# Patient Record
Sex: FEMALE | Race: WHITE | NOT HISPANIC OR LATINO | Employment: PART TIME | URBAN - METROPOLITAN AREA
[De-identification: names, ages, dates, MRNs, and addresses within clinical notes are randomized per-mention and may not be internally consistent; named-entity substitution may affect disease eponyms.]

---

## 2017-10-07 ENCOUNTER — OFFICE VISIT (OUTPATIENT)
Dept: URGENT CARE | Age: 57
End: 2017-10-07
Payer: COMMERCIAL

## 2017-10-07 ENCOUNTER — HOSPITAL ENCOUNTER (INPATIENT)
Facility: HOSPITAL | Age: 57
LOS: 1 days | Discharge: HOME | DRG: 066 | End: 2017-10-09
Attending: EMERGENCY MEDICINE | Admitting: INTERNAL MEDICINE
Payer: COMMERCIAL

## 2017-10-07 ENCOUNTER — APPOINTMENT (EMERGENCY)
Dept: CT IMAGING | Facility: HOSPITAL | Age: 57
DRG: 066 | End: 2017-10-07
Payer: COMMERCIAL

## 2017-10-07 DIAGNOSIS — G45.9 TIA (TRANSIENT ISCHEMIC ATTACK): Primary | ICD-10-CM

## 2017-10-07 DIAGNOSIS — R09.89 SYMPTOMS OF CEREBROVASCULAR ACCIDENT (CVA): ICD-10-CM

## 2017-10-07 LAB
ALBUMIN SERPL BCP-MCNC: 3.8 G/DL (ref 3.5–5)
ALP SERPL-CCNC: 69 U/L (ref 46–116)
ALT SERPL W P-5'-P-CCNC: 32 U/L (ref 12–78)
ANION GAP SERPL CALCULATED.3IONS-SCNC: 4 MMOL/L (ref 4–13)
APTT PPP: 28 SECONDS (ref 23–35)
AST SERPL W P-5'-P-CCNC: 26 U/L (ref 5–45)
BASOPHILS # BLD AUTO: 0.03 THOUSANDS/ΜL (ref 0–0.1)
BASOPHILS NFR BLD AUTO: 0 % (ref 0–1)
BILIRUB SERPL-MCNC: 0.4 MG/DL (ref 0.2–1)
BUN SERPL-MCNC: 14 MG/DL (ref 5–25)
CALCIUM SERPL-MCNC: 9 MG/DL (ref 8.3–10.1)
CHLORIDE SERPL-SCNC: 103 MMOL/L (ref 100–108)
CO2 SERPL-SCNC: 28 MMOL/L (ref 21–32)
CREAT SERPL-MCNC: 0.78 MG/DL (ref 0.6–1.3)
EOSINOPHIL # BLD AUTO: 0.35 THOUSAND/ΜL (ref 0–0.61)
EOSINOPHIL NFR BLD AUTO: 4 % (ref 0–6)
ERYTHROCYTE [DISTWIDTH] IN BLOOD BY AUTOMATED COUNT: 13.7 % (ref 11.6–15.1)
FIBRINOGEN PPP-MCNC: 284 MG/DL (ref 227–495)
GFR SERPL CREATININE-BSD FRML MDRD: 84 ML/MIN/1.73SQ M
GLUCOSE SERPL-MCNC: 88 MG/DL (ref 65–140)
GLUCOSE SERPL-MCNC: 90 MG/DL (ref 65–140)
HCT VFR BLD AUTO: 41.2 % (ref 34.8–46.1)
HGB BLD-MCNC: 13.5 G/DL (ref 11.5–15.4)
INR PPP: 0.86 (ref 0.86–1.16)
LYMPHOCYTES # BLD AUTO: 3.29 THOUSANDS/ΜL (ref 0.6–4.47)
LYMPHOCYTES NFR BLD AUTO: 36 % (ref 14–44)
MCH RBC QN AUTO: 30 PG (ref 26.8–34.3)
MCHC RBC AUTO-ENTMCNC: 32.8 G/DL (ref 31.4–37.4)
MCV RBC AUTO: 92 FL (ref 82–98)
MONOCYTES # BLD AUTO: 0.9 THOUSAND/ΜL (ref 0.17–1.22)
MONOCYTES NFR BLD AUTO: 10 % (ref 4–12)
NEUTROPHILS # BLD AUTO: 4.63 THOUSANDS/ΜL (ref 1.85–7.62)
NEUTS SEG NFR BLD AUTO: 50 % (ref 43–75)
PLATELET # BLD AUTO: 350 THOUSANDS/UL (ref 149–390)
PLATELET # BLD AUTO: 350 THOUSANDS/UL (ref 149–390)
PMV BLD AUTO: 10.7 FL (ref 8.9–12.7)
PMV BLD AUTO: 10.7 FL (ref 8.9–12.7)
POTASSIUM SERPL-SCNC: 3.7 MMOL/L (ref 3.5–5.3)
PROT SERPL-MCNC: 7.3 G/DL (ref 6.4–8.2)
PROTHROMBIN TIME: 12 SECONDS (ref 12.1–14.4)
RBC # BLD AUTO: 4.5 MILLION/UL (ref 3.81–5.12)
SODIUM SERPL-SCNC: 135 MMOL/L (ref 136–145)
TROPONIN I SERPL-MCNC: <0.02 NG/ML
WBC # BLD AUTO: 9.2 THOUSAND/UL (ref 4.31–10.16)

## 2017-10-07 PROCEDURE — 85730 THROMBOPLASTIN TIME PARTIAL: CPT | Performed by: EMERGENCY MEDICINE

## 2017-10-07 PROCEDURE — 80053 COMPREHEN METABOLIC PANEL: CPT | Performed by: EMERGENCY MEDICINE

## 2017-10-07 PROCEDURE — 93005 ELECTROCARDIOGRAM TRACING: CPT

## 2017-10-07 PROCEDURE — 84484 ASSAY OF TROPONIN QUANT: CPT | Performed by: EMERGENCY MEDICINE

## 2017-10-07 PROCEDURE — 85611 PROTHROMBIN TEST: CPT | Performed by: EMERGENCY MEDICINE

## 2017-10-07 PROCEDURE — 82948 REAGENT STRIP/BLOOD GLUCOSE: CPT

## 2017-10-07 PROCEDURE — 85384 FIBRINOGEN ACTIVITY: CPT | Performed by: EMERGENCY MEDICINE

## 2017-10-07 PROCEDURE — 85025 COMPLETE CBC W/AUTO DIFF WBC: CPT | Performed by: EMERGENCY MEDICINE

## 2017-10-07 PROCEDURE — 70450 CT HEAD/BRAIN W/O DYE: CPT

## 2017-10-07 PROCEDURE — 85610 PROTHROMBIN TIME: CPT | Performed by: EMERGENCY MEDICINE

## 2017-10-07 PROCEDURE — 85049 AUTOMATED PLATELET COUNT: CPT | Performed by: EMERGENCY MEDICINE

## 2017-10-07 PROCEDURE — 85732 THROMBOPLASTIN TIME PARTIAL: CPT | Performed by: EMERGENCY MEDICINE

## 2017-10-07 PROCEDURE — 36415 COLL VENOUS BLD VENIPUNCTURE: CPT | Performed by: EMERGENCY MEDICINE

## 2017-10-07 PROCEDURE — 85670 THROMBIN TIME PLASMA: CPT | Performed by: EMERGENCY MEDICINE

## 2017-10-07 PROCEDURE — 93005 ELECTROCARDIOGRAM TRACING: CPT | Performed by: EMERGENCY MEDICINE

## 2017-10-07 PROCEDURE — 99203 OFFICE O/P NEW LOW 30 MIN: CPT | Performed by: FAMILY MEDICINE

## 2017-10-07 PROCEDURE — 99285 EMERGENCY DEPT VISIT HI MDM: CPT

## 2017-10-07 RX ORDER — ATORVASTATIN CALCIUM 40 MG/1
40 TABLET, FILM COATED ORAL EVERY EVENING
Status: DISCONTINUED | OUTPATIENT
Start: 2017-10-07 | End: 2017-10-09 | Stop reason: HOSPADM

## 2017-10-07 RX ORDER — ASPIRIN 81 MG/1
81 TABLET, CHEWABLE ORAL DAILY
Status: DISCONTINUED | OUTPATIENT
Start: 2017-10-08 | End: 2017-10-09

## 2017-10-07 NOTE — PLAN OF CARE
NEUROSENSORY - ADULT     Achieves stable or improved neurological status Progressing     Achieves maximal functionality and self care Progressing

## 2017-10-07 NOTE — H&P
History and Physical - [facility] Internal Medicine    Patient Information: Jamia Arnold 61 y o  female MRN: [id number]  Unit/Bed#: RAIN Encounter: [id number]  Admitting Physician: Fransisca Desir MD  PCP: Melinda Rod DO  Date of Admission:  10/07/17    Assessment/Plan:    Hospital Problem List:     Principal Problem:    Symptoms of cerebrovascular accident (CVA)      Plan for the Primary Problem(s):    · Left upper extremity distal weakness and facial droop - overall out TIA, placed on stroke pathway, neuro checks, aspirin 81 mg, Lipitor 40 mg, Neurology consultation, PT OT    The patient and her  was at bedside were provided with a detailed updated, they are agreeable with ongoing management as outlined as well as consult requested  VTE Prophylaxis: Enoxaparin (Lovenox)  / sequential compression device   Code Status:  Full code  POLST: There is no POLST form on file for this patient (pre-hospital)    Anticipated Length of Stay:  Patient will be admitted on an Observation basis with an anticipated length of stay of  Less than 2 midnights  Chief Complaint:       Left upper extremity distal weakness and facial droop yesterday    History of Present Illness:    Jamia Arnold is a 61 y o  female who presents with left upper extremity distal weakness and left facial numbness noted yesterday  Patient was putting laundry to dry on the clothes line yesterday when she noticed that she had difficulty manipulating the clips with her left hand she reports it as "an annoying feeling that I was unable to control my left hand" she reports she had good strength in her shoulder and elbow  She also reports some numbness and tingling on her forearm area  At the same time she also noticed left facial droop /numbness  The symptoms resolved within a couple of minutes and have not recurred  She denies history suggestive of cranial involvement, speech abnormalities, sphincter involvement    No history of weakness elsewhere  No history of sensory symptoms elsewhere  She reports some headache  She reports "something is just not right"    No history of fever chills sweats or constitutional symptoms  No history of chest pain shortness of breath palpitations presyncope syncope  No history of abdominal pain vomiting diarrhea constipation  No history of change in weight, appetite unchanged  No history of rash arthritis arthralgia myalgia  She reports history of factor 5 laden in the family  She denies similar symptoms in the past, no history of known coronary artery disease diabetes hypertension dyslipidemia  Review of Systems:    Review of Systems   All other systems reviewed and are negative  Past Medical and Surgical History:     History reviewed  No pertinent past medical history  History reviewed  No pertinent surgical history  Meds/Allergies:    Prior to Admission medications    Medication Sig Start Date End Date Taking? Authorizing Provider   COD LIVER OIL PO Take by mouth   Yes Historical Provider, MD     I have reviewed home medications with patient personally  Allergies: No Known Allergies    Social History:     Marital Status: /Civil Union   Occupation:   Patient Pre-hospital Living Situation: home  Patient Pre-hospital Level of Mobility:  Independent  Patient Pre-hospital Diet Restrictions: no  Substance Use History:   History   Alcohol Use No     History   Smoking Status    Never Smoker   Smokeless Tobacco    Never Used     History   Drug Use No       Family History:    History reviewed  No pertinent family history      Physical Exam:     Vitals:   Blood Pressure: 123/63 (10/07/17 1734)  Pulse: 55 (10/07/17 1734)  Temperature: 98 4 °F (36 9 °C) (10/07/17 1430)  Temp Source: Oral (10/07/17 1430)  Respirations: 18 (10/07/17 1734)  Weight - Scale: 70 6 kg (155 lb 10 3 oz) (10/07/17 1457)  SpO2: 100 % (10/07/17 1734)    Physical Exam   Constitutional: She is oriented to person, place, and time  She appears well-developed and well-nourished  No distress  HENT:   Head: Normocephalic  Mouth/Throat: No oropharyngeal exudate  Eyes: Pupils are equal, round, and reactive to light  Right eye exhibits no discharge  Left eye exhibits no discharge  No scleral icterus  Neck: Normal range of motion  No JVD present  No tracheal deviation present  No thyromegaly present  Cardiovascular: Normal rate  No murmur heard  Pulmonary/Chest: Effort normal  She has no wheezes  She has no rales  She exhibits no tenderness  Abdominal: Soft  She exhibits no distension and no mass  There is no tenderness  There is no rebound and no guarding  Musculoskeletal: Normal range of motion  She exhibits no edema  Lymphadenopathy:     She has no cervical adenopathy  Neurological: She is alert and oriented to person, place, and time  No cranial nerve deficit  Skin: Skin is warm  No rash noted  She is not diaphoretic  Vitals reviewed  Additional Data:     Lab Results: I have personally reviewed pertinent reports  Results from last 7 days  Lab Units 10/07/17  1452   WBC Thousand/uL 9 20   HEMOGLOBIN g/dL 13 5   HEMATOCRIT % 41 2   PLATELETS Thousands/uL 350  350   NEUTROS PCT % 50   LYMPHS PCT % 36   MONOS PCT % 10   EOS PCT % 4       Results from last 7 days  Lab Units 10/07/17  1452   SODIUM mmol/L 135*   POTASSIUM mmol/L 3 7   CHLORIDE mmol/L 103   CO2 mmol/L 28   BUN mg/dL 14   CREATININE mg/dL 0 78   CALCIUM mg/dL 9 0   TOTAL PROTEIN g/dL 7 3   BILIRUBIN TOTAL mg/dL 0 40   ALK PHOS U/L 69   ALT U/L 32   AST U/L 26   GLUCOSE RANDOM mg/dL 90       Results from last 7 days  Lab Units 10/07/17  1452   INR  0 86       Imaging: I have personally reviewed pertinent reports  Ct Head Without Contrast    Result Date: 10/7/2017  Narrative: CT BRAIN - WITHOUT CONTRAST INDICATION:  Left arm weakness  Left hand numbness  COMPARISON:  None  TECHNIQUE:  CT examination of the brain was performed    In addition to axial images, coronal reformatted images were created and submitted for interpretation  Radiation dose length product (DLP) for this visit:  1000 mGy-cm   This examination, like all CT scans performed in the [facility], was performed utilizing techniques to minimize radiation dose exposure, including the use of iterative reconstruction and automated exposure control  IMAGE QUALITY:  Diagnostic  FINDINGS:  PARENCHYMA:  There is a focal area of low density identified within the lateral aspect of the right frontal lobe adjacent to the frontal operculum, series 2 image 18  This suggests mild localized edema  No associated hemorrhage  Mild localized mass effect  There is additional much smaller area of low density involving the cortex of the insula, slightly more posteriorly on series 2 image 18  Normal left hemisphere  Normal basal ganglia and basilar cisterns  Brainstem and cerebellum demonstrate normal density  There is no parenchymal hemorrhage  VENTRICLES AND EXTRA-AXIAL SPACES:  Normal for patient's age  VISUALIZED ORBITS AND PARANASAL SINUSES:  Unremarkable orbits  Mucosal thickening of the paranasal sinuses especially the right maxillary sinus where there is an air-fluid level which suggests an acute component of sinusitis  CALVARIUM AND EXTRACRANIAL SOFT TISSUES:   Normal      Impression: 2 separate areas of low density within the right hemisphere, both seen on series 2 image 18  The larger of the 2 measures approximately 2 2 cm involving cortex and adjacent white matter  Findings are nonspecific but may represent subacute infarcts  An  underlying mass cannot be excluded  Recommend MRI of the brain with contrast to include diffusion-weighted imaging to help differentiate infarct from mass  No hemorrhage identified   ##[email or web address]##imslh Workstation performed: [id number]       EKG, Pathology, and Other Studies Reviewed on Admission:   · EKG:  Normal sinus rhythm on telemetry    Allscripts / Epic Records Reviewed: No     ** Please Note: This note has been constructed using a voice recognition system   **

## 2017-10-08 ENCOUNTER — APPOINTMENT (INPATIENT)
Dept: ULTRASOUND IMAGING | Facility: HOSPITAL | Age: 57
DRG: 066 | End: 2017-10-08
Payer: COMMERCIAL

## 2017-10-08 ENCOUNTER — APPOINTMENT (OUTPATIENT)
Dept: CT IMAGING | Facility: HOSPITAL | Age: 57
DRG: 066 | End: 2017-10-08
Payer: COMMERCIAL

## 2017-10-08 ENCOUNTER — APPOINTMENT (OUTPATIENT)
Dept: MRI IMAGING | Facility: HOSPITAL | Age: 57
DRG: 066 | End: 2017-10-08
Payer: COMMERCIAL

## 2017-10-08 LAB
APTT PPP: 30 SECONDS (ref 23–35)
ATRIAL RATE: 54 BPM
CHOLEST SERPL-MCNC: 193 MG/DL (ref 50–200)
EST. AVERAGE GLUCOSE BLD GHB EST-MCNC: 120 MG/DL
HBA1C MFR BLD: 5.8 % (ref 4.2–6.3)
HDLC SERPL-MCNC: 71 MG/DL (ref 40–60)
LDLC SERPL CALC-MCNC: 105 MG/DL (ref 0–100)
P AXIS: 45 DEGREES
PR INTERVAL: 150 MS
PROTHROMBIN TIME: 13.1 SECONDS (ref 12.1–14.4)
QRS AXIS: 22 DEGREES
QRSD INTERVAL: 80 MS
QT INTERVAL: 442 MS
QTC INTERVAL: 419 MS
T WAVE AXIS: -7 DEGREES
THROMBIN TIME: 17 SECONDS (ref 14.7–18.4)
THROMBIN TIME: 17 SECONDS (ref 14.7–18.4)
TRIGL SERPL-MCNC: 83 MG/DL
VENTRICULAR RATE: 54 BPM

## 2017-10-08 PROCEDURE — 70498 CT ANGIOGRAPHY NECK: CPT

## 2017-10-08 PROCEDURE — 80061 LIPID PANEL: CPT | Performed by: INTERNAL MEDICINE

## 2017-10-08 PROCEDURE — G8980 MOBILITY D/C STATUS: HCPCS

## 2017-10-08 PROCEDURE — 97161 PT EVAL LOW COMPLEX 20 MIN: CPT

## 2017-10-08 PROCEDURE — 83036 HEMOGLOBIN GLYCOSYLATED A1C: CPT | Performed by: INTERNAL MEDICINE

## 2017-10-08 PROCEDURE — 70496 CT ANGIOGRAPHY HEAD: CPT

## 2017-10-08 PROCEDURE — G8978 MOBILITY CURRENT STATUS: HCPCS

## 2017-10-08 PROCEDURE — 93970 EXTREMITY STUDY: CPT

## 2017-10-08 PROCEDURE — G8979 MOBILITY GOAL STATUS: HCPCS

## 2017-10-08 PROCEDURE — 70551 MRI BRAIN STEM W/O DYE: CPT

## 2017-10-08 RX ADMIN — IOHEXOL 85 ML: 350 INJECTION, SOLUTION INTRAVENOUS at 14:07

## 2017-10-08 RX ADMIN — ASPIRIN 81 MG 81 MG: 81 TABLET ORAL at 08:16

## 2017-10-08 NOTE — CASE MANAGEMENT
Initial Clinical Review    St  [address] [facility] in the [facility] by [address] for 2017  Network Utilization Review Department  Phone: [phone]; Fax [phone]  ATTENTION: The Network Utilization Review Department is now centralized for our 7 Facilities  Make a note that we have a new phone and fax numbers for our Department  Please call with any questions or concerns to [phone] and carefully follow the prompts so that you are directed to the right person  All voicemails are confidential  Fax any determinations, approvals, denials, and requests for initial or continue stay review clinical to [phone]  Due to HIGH CALL volume, it would be easier if you could please send faxed requests to expedite your requests and in part, help us provide discharge notifications faster  Admission: Date/Time/Statement: Placed in Observation status  On 10/7 @ 17:24 changed to INPATIENT  On 10/8 @ 13:04    Orders Placed This Encounter   Procedures    Place in Observation (expected length of stay for this patient is less than two midnights)     Standing Status:   Standing     Number of Occurrences:   1     Order Specific Question:   Admitting Physician     Answer:   Joy Armstrong     Order Specific Question:   Level of Care     Answer:   Med Surg [16]     Start     10/08/17 1304  Inpatient Admission    Transfer Service: General Medicine       Question Answer   Admitting Physician Shayla Morel   Level of Care Med Surg   Estimated length of stay More than 2 Midnights   Certification I certify that inpatient services are medically necessary for this patient for a duration of greater than two midnights  See H&P and MD Progress Notes for additional information about the patient's course of treatment                   ED: Date/Time/Mode of Arrival:   ED Arrival Information     Expected Arrival Acuity Means of Arrival Escorted By Service Admission Type 10/7/2017 13:36 10/7/2017 13:51 Emergent Walk-In Family Member General Medicine Emergency    Arrival Complaint    poss tia yesterday  Chief Complaint:   Chief Complaint   Patient presents with    Numbness     pt reports episode of left arm numbness when she was hanging cloths outside  pt reports she had no use of hand  Pt reports when this happened she "felt the left side of my face droop, but as fast as it came one, it went away "  pt reports episode lasted inbetween 30seconds to a minute  Pt reports lingering headache  History of Illness: Norris Tipton is a 61 y o  female who presents with left upper extremity distal weakness and left facial numbness noted yesterday  Patient was putting laundry to dry on the clothes line yesterday when she noticed that she had difficulty manipulating the clips with her left hand she reports it as "an annoying feeling that I was unable to control my left hand" she reports she had good strength in her shoulder and elbow  She also reports some numbness and tingling on her forearm area  At the same time she also noticed left facial droop /numbness  The symptoms resolved within a couple of minutes and have not recurred  She denies history suggestive of cranial involvement, speech abnormalities, sphincter involvement  No history of weakness elsewhere  No history of sensory symptoms elsewhere  She reports some headache  She reports "something is just not right"     No history of fever chills sweats or constitutional symptoms  No history of chest pain shortness of breath palpitations presyncope syncope  No history of abdominal pain vomiting diarrhea constipation  No history of change in weight, appetite unchanged  No history of rash arthritis arthralgia myalgia      She reports history of factor 5 laden in the family  She denies similar symptoms in the past, no history of known coronary artery disease diabetes hypertension dyslipidemia      ED Vital Signs:   ED Triage Vitals   Temperature Pulse Respirations Blood Pressure SpO2   10/07/17 1430 10/07/17 1420 10/07/17 1420 10/07/17 1420 10/07/17 1420   98 4 °F (36 9 °C) 61 18 (!) 182/90 98 %      Temp Source Heart Rate Source Patient Position - Orthostatic VS BP Location FiO2 (%)   10/07/17 1430 10/07/17 1420 10/07/17 1420 10/07/17 1420 --   Oral Monitor Lying Right arm       Pain Score       10/07/17 1420       2        Wt Readings from Last 1 Encounters:   10/07/17 67 9 kg (149 lb 11 1 oz)       Vital Signs (abnormal):   Date/Time Temp Pulse Resp BP MAP (mmHg) SpO2 O2 Device Patient Position - Orthostatic VS   10/08/17 0700 98 4 °F (36 9 °C)  54 18 105/59 -- 98 % None (Room air) Lying   10/08/17 0500 98 2 °F (36 8 °C)  53 18 111/59 80 96 % None (Room air) Lying   10/08/17 0300 98 2 °F (36 8 °C)  52 18 107/58 78 97 % None (Room air) Lying   10/08/17 0100 97 6 °F (36 4 °C) 56 18 101/55 72 96 % None (Room air) Lying   10/07/17 2209 98 3 °F (36 8 °C) 59 18 116/55 -- 95 % None (Room air) Lying   10/07/17 2100 98 2 °F (36 8 °C) 62 18 122/62 -- 96 % None (Room air) Sitting   10/1960 98 3 °F (36 8 °C) 67 18 123/69 -- 98 % None (Room air) Sitting   10/07/17 1900 98 1 °F (36 7 °C) 63 18 134/75 -- 99 % None (Room air) Sitting   10/07/17 1808 98 2 °F (36 8 °C) 56 18 115/67 -- 97 % None (Room air) Lying   10/07/17 1734 -- 55 18 123/63 -- 100 % None (Room air) Sitting   10/07/17 1652 --  54 18 119/60 -- 99 % None (Room air) Sitting   10/07/17 1555 -- 58 18 119/64 -- 100 % None (Room air) Sitting   10/07/17 1430 98 4 °F (36 9 °C) 58 18 139/76 -- 99 % None (Room air) Lying   10/07/17 1420 -- 61 18  182/90 -- 98 % None (Room air) Lying         Abnormal Labs/Diagnostic Test Results:   - Bun/cr 14/0 78 - CBC - normal limits   Troponin  0 02    Ct Head - 2 separate areas of low density within the right hemisphere, both seen on series 2 image 18    The larger of the 2 measures approximately 2 2 cm involving cortex and adjacent white matter  Findings are nonspecific but may represent subacute infarcts  An  underlying mass cannot be excluded  Recommend MRI of the brain with contrast to include diffusion-weighted imaging to help differentiate infarct from mass        ED Treatment:   Medication Administration from 10/07/2017 1336 to 10/07/2017 1803     None          Past Medical/Surgical History:     No Additional Past Medical History       Admitting Diagnosis: TIA (transient ischemic attack) [G45 9]  Symptoms of cerebrovascular accident (CVA) [R29 90]    Age/Sex: 61 y o  female    Assessment/Plan: Plan for the Primary Problem(s):     · Left upper extremity distal weakness and facial droop - overall out TIA, placed on stroke pathway, neuro checks, aspirin 81 mg, Lipitor 40 mg, Neurology consultation, PT OT       Admission Orders:  Scheduled Meds:   aspirin 81 mg Oral Daily   atorvastatin 40 mg Oral QPM   enoxaparin 40 mg Subcutaneous Daily     Continuous Infusions:    PRN Meds:     Nursing Orders - Telem - VS q 4  - Neuro checks q 1 x 4 q 2 x 4 then q 4 - Stroke education - Incentive spirometry  - Up with assistance - diet regular House - PT/OT eval - Speech therapy ( dysphagia eval )   Neurology consult

## 2017-10-08 NOTE — CONSULTS
PATIENT NAME: Kristina White  YOB: 1960   MEDICAL RECORD NUMBER: [id number]  Chief Complaint/Reason for Consult: left sided weakness     HPI: Kristina White is a 61 y o  female with history of factor 5 leiden deficiency (by history - sister with PE, daughter and sister both tested), otherwise no vascular risk factors who was placing laundry to dry yesterday when she noted that she had a hard time utiliznig her left hand and had some numbness and tingling in her forearm and face  This lasted all together under 10 minutes  She had no associated symptoms  No neck pain, palpitations  She also felt nonspecifically off and had a headache the same day and told family the history who recommended she be worked up  She came in and CT head showed potential CVA in the right frontal area  PAST MEDICAL HISTORY  History reviewed  No pertinent past medical history  PAST SURGICAL HISTORY  History reviewed  No pertinent surgical history  ALLERGIES: Review of patient's allergies indicates no known allergies  CURRENT MEDICATIONS  Scheduled Meds:  aspirin 81 mg Oral Daily   atorvastatin 40 mg Oral QPM   enoxaparin 40 mg Subcutaneous Daily     Continuous Infusions:   PRN Meds:  SOCIAL HISTORY   reports that she has never smoked  She has never used smokeless tobacco  She reports that she does not drink alcohol or use drugs  FAMILY HISTORY  History reviewed  No pertinent family history  REVIEW OF SYSTEMS   Constitutional: Negative for fever, chills, diaphoresis, activity change and appetite change  HEENT: Negative for hearing loss, ear pain, facial swelling, neck pain, neck stiffness, tinnitus and ear discharge  Negative for ocular pain, discharge, redness and itching  Respiratory: Negative for apnea, chest tightness, shortness of breath, wheezing and stridor  Cardiovascular: Negative for chest pain, palpitations and leg swelling     Gastrointestinal: Negative for diarrhea, constipation and abdominal distention  Endocrine: Negative for cold intolerance and heat intolerance  Genitourinary: Negative for dysuria, urgency, frequency, hematuria, flank pain and difficulty urinating  Neurological: Negative except for HPI  Hematological: Negative for adenopathy  Does not bruise/bleed easily  Psychiatric/Behavioral: Negative for behavioral problems and agitation  Otherwise complete review of systems is negative aside from what is mentioned above and in the HPI  PHYSICAL EXAMINATION  Temp:  [97 6 °F (36 4 °C)-98 4 °F (36 9 °C)] 98 4 °F (36 9 °C)  HR:  [52-67] 54  Resp:  [18] 18  BP: (101-182)/(55-90) 105/59   General Examination: In no apparent distress, well developed and well nourished, and cooperative   HEENT: Normocephalic, Atraumatic  Moist mucus membranes  Anicteric  PPC    CVS: Regular rate and rhythm  S1 S2 noted  No audible murmurs  No carotids bruits  Peripheral pulses palpable throughout   Lungs: Clear to auscultation bilaterally  No rales, rhonchi, wheezing  Abdomen: Bowel sounds positive  Non- tender  Non-distended  No organomegaly  Ext: No edema   Psych: Thought content - No VH/AH  No delusions  Though Process - logical    Skin - No rash    Neurological Examination:   NIHSS - 0    Mental Status: The patient was awake, alert, attentive, oriented to person, place, and time  Recent and remote memory intact to conversation with no evidence of language dysfunction  Satisfactory fund of knowledge  Normal attention span and concentration  Able to name, repeat, describe a complex scene  Cranial Nerves:   I: smell Not tested   II: visual fields Full to confrontation  Pupils equal, round, reactive to light with normal accomodation  Fundus: benign fundus  III,IV,VI: extraocular muscles EOMI, no nystagmus   V: masseter and pterygoid strength full  Sensation in the V1 through V3 distributions intact to pinprick and light touch bilaterally     VII: Face is symmetric with no weakness noted    VIII: Audition intact to finger rub bilaterally  IX/X: Uvula midline  Soft palate elevation symmetric  XI: Trapezius and SCM strength 5/5 B/L  XII: Tongue midline with no atrophy or fasciculations with appropriate movement  Motor Examination:   No pronator drift  Bulk: Normal  No atrophy Tone: Normal  Fasciculations: None  Deltoid Biceps Triceps WE   WF   FF IO     Right        5         5          5         5      5      5   5        Left           5        5          5          5      5     5   5                       IP        Quad   Ham     TA       Gastroc   Right      5            5          5         5                5  Left         5            5         5         5                5       Reflexes:                   Biceps Brachioradialis Triceps Patella Achilles Plantars   Right          2+            2+                  2+        2+       2+         Down   Left            2+             2+                 2+         2+       2+         Down     Clonus: None    Pathological Reflexes:  Hoffmans: negative  Babinsky: negative  Jaw Jerk: negative    Coordination: Patient able to perform normal finger-to-nose and heel to shin appropriately  Normal rapid alternating movements  Sensory: Normal sensation to light touch, pin prick and vibratory sensation throughout  Gait:normal stance and posture, normal stride length and arm swing, normal turn around  Patient able to perform tandem gait without difficulty  Able toe walk and heel walk without difficulty  Romberg negative      Labs:  Recent Results (from the past 24 hour(s))   ECG 12 lead    Collection Time: 10/07/17  2:16 PM   Result Value Ref Range    Ventricular Rate 54 BPM    Atrial Rate 54 BPM    CT Interval 150 ms    QRSD Interval 80 ms    QT Interval 442 ms    QTC Interval 419 ms    P Axis 45 degrees    QRS Axis 22 degrees    T Wave Axis -7 degrees   CBC and differential    Collection Time: 10/07/17  2:52 PM   Result Value Ref Range    WBC 9 20 4  31 - 10 16 Thousand/uL    RBC 4 50 3 81 - 5 12 Million/uL    Hemoglobin 13 5 11 5 - 15 4 g/dL    Hematocrit 41 2 34 8 - 46 1 %    MCV 92 82 - 98 fL    MCH 30 0 26 8 - 34 3 pg    MCHC 32 8 31 4 - 37 4 g/dL    RDW 13 7 11 6 - 15 1 %    MPV 10 7 8 9 - 12 7 fL    Platelets 272 710 - 013 Thousands/uL    Neutrophils Relative 50 43 - 75 %    Lymphocytes Relative 36 14 - 44 %    Monocytes Relative 10 4 - 12 %    Eosinophils Relative 4 0 - 6 %    Basophils Relative 0 0 - 1 %    Neutrophils Absolute 4 63 1 85 - 7 62 Thousands/µL    Lymphocytes Absolute 3 29 0 60 - 4 47 Thousands/µL    Monocytes Absolute 0 90 0 17 - 1 22 Thousand/µL    Eosinophils Absolute 0 35 0 00 - 0 61 Thousand/µL    Basophils Absolute 0 03 0 00 - 0 10 Thousands/µL   Comprehensive metabolic panel    Collection Time: 10/07/17  2:52 PM   Result Value Ref Range    Sodium 135 (L) 136 - 145 mmol/L    Potassium 3 7 3 5 - 5 3 mmol/L    Chloride 103 100 - 108 mmol/L    CO2 28 21 - 32 mmol/L    Anion Gap 4 4 - 13 mmol/L    BUN 14 5 - 25 mg/dL    Creatinine 0 78 0 60 - 1 30 mg/dL    Glucose 90 65 - 140 mg/dL    Calcium 9 0 8 3 - 10 1 mg/dL    AST 26 5 - 45 U/L    ALT 32 12 - 78 U/L    Alkaline Phosphatase 69 46 - 116 U/L    Total Protein 7 3 6 4 - 8 2 g/dL    Albumin 3 8 3 5 - 5 0 g/dL    Total Bilirubin 0 40 0 20 - 1 00 mg/dL    eGFR 84 ml/min/1 73sq m   Troponin I    Collection Time: 10/07/17  2:52 PM   Result Value Ref Range    Troponin I <0 02 <=0 04 ng/mL   Equal mix, APTT    Collection Time: 10/07/17  2:52 PM   Result Value Ref Range    PTT Mixing Study Room Temp  24 - 36 Seconds    PTT Mixing Study Incubated  24 - 36 Seconds    PTT 30 23 - 35 seconds   Equal mix, PT / INR    Collection Time: 10/07/17  2:52 PM   Result Value Ref Range    PT Mixing Study Room Temp  12 0 - 14 3 Seconds    PT Mixing Study Incubated  12 0 - 14 3 SEC    Protime 13 1 12 1 - 14 4 seconds   Thrombin Time Mixing Study    Collection Time: 10/07/17  2:52 PM   Result Value Ref Range    Thrombin Time Mixing Room Temp  14 7 - 18 4 seconds    Thrombin Time Mixing Incubated  14 7 - 18 4 seconds    Thrombin Time 17 0 14 7 - 18 4 seconds   Platelet count    Collection Time: 10/07/17  2:52 PM   Result Value Ref Range    Platelets 646 447 - 011 Thousands/uL    MPV 10 7 8 9 - 12 7 fL   Protime-INR    Collection Time: 10/07/17  2:52 PM   Result Value Ref Range    Protime 12 0 (L) 12 1 - 14 4 seconds    INR 0 86 0 86 - 1 16   APTT    Collection Time: 10/07/17  2:52 PM   Result Value Ref Range    PTT 28 23 - 35 seconds   Thrombin time    Collection Time: 10/07/17  2:52 PM   Result Value Ref Range    Thrombin Time 17 0 14 7 - 18 4 seconds   Fibrinogen    Collection Time: 10/07/17  2:52 PM   Result Value Ref Range    Fibrinogen 284 227 - 495 mg/dL   Fingerstick Glucose (POCT)    Collection Time: 10/07/17  3:00 PM   Result Value Ref Range    POC Glucose 88 65 - 140 mg/dl   Lipid Panel with Direct LDL reflex    Collection Time: 10/08/17  6:05 AM   Result Value Ref Range    Cholesterol 193 50 - 200 mg/dL    Triglycerides 83 <=150 mg/dL    HDL, Direct 71 (H) 40 - 60 mg/dL    LDL Calculated 105 (H) 0 - 100 mg/dL            panel  Results from last 7 days  Lab Units 10/07/17  1452   SODIUM mmol/L 135*   POTASSIUM mmol/L 3 7   CHLORIDE mmol/L 103   GLUCOSE RANDOM mg/dL 90   BUN mg/dL 14   CREATININE mg/dL 0 78      Results from last 7 days  Lab Units 10/07/17  1452   HEMATOCRIT % 41 2   HEMOGLOBIN g/dL 13 5   MCH pg 30 0   MCHC g/dL 32 8   MCV fL 92   MPV fL 10 7  10 7   PLATELETS Thousands/uL 350  350   RDW % 13 7   WBC Thousand/uL 9 20      Results from last 7 days  Lab Units 10/07/17  1452   INR  0 86   PTT seconds 30  28      Results from last 7 days  Lab Units 10/07/17  1452   CO2 mmol/L 28   BUN mg/dL 14   CREATININE mg/dL 0 78   GLUCOSE RANDOM mg/dL 90    Lab Results   Component Value Date    ALT 32 10/07/2017    AST 26 10/07/2017    ALKPHOS 69 10/07/2017      Results from last 7 days  Lab Units 10/08/17  0605   HDL mg/dL 71*    No results found for: HGBA1C TSH i: No components found for: TSH Imaging: CT head     2 separate areas of low density within the right hemisphere, both seen on series 2 image 18  The larger of the 2 measures approximately 2 2 cm involving cortex and adjacent white matter  Findings are nonspecific but may represent subacute infarcts  An   underlying mass cannot be excluded  Recommend MRI of the brain with contrast to include diffusion-weighted imaging to help differentiate infarct from mass  ASSESSMENT/PLAN  62 yo female with transient left sided symptoms in setting of right frontal hypodensity  CVA vs mass  Obtain MRI  Keep on aspirin  If CVA need to work up for venous clots given factor 5 leidin istory  If mass may have been focal seizure, would check EEG  CTA H/N inn case of CVA  Lower extremity dopplers (had recent plan flight to [place])  DVT PPX   Euglycemia (140-180 goal)   Normothermia     Please call with questions

## 2017-10-08 NOTE — PHYSICAL THERAPY NOTE
PHYSICAL THERAPY EVALUATION  NAME:  Zoya Hurst  DATE: 10/08/17    AGE:   61 y o  Mrn:   [id number]  ADMIT DX:  TIA (transient ischemic attack) [G45 9]  Symptoms of cerebrovascular accident (CVA) [R29 90]    History reviewed  No pertinent past medical history  Length Of Stay: 0  PHYSICAL THERAPY EVALUATION :    10/08/17 0909   Note Type   Note type Eval only   Pain Assessment   Pain Assessment 0-10   Pain Score 2  (mild headache, dull)   Pain Type Acute pain   Pain Location Head   Pain Orientation Left;Frontal   Pain Frequency Intermittent   Effect of Pain on Daily Activities none   Patient's Stated Pain Goal No pain   Hospital Pain Intervention(s) Emotional support; Ambulation/increased activity   Home Living   Type of [place] (none)   Prior Function   Level of [place] Independent with ADLs and functional mobility   Lives With Spouse   Falls in the last 6 months 0   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)   Restrictions/Precautions   Weight Bearing Precautions Per Order No   Other Precautions Pain   General   Additional Pertinent History PEr CT impression : 2 separate areas of low density within the right hemisphere, both seen on series 2 image 18  The larger of the 2 measures approximately 2 2 cm involving cortex and adjacent white matter  Findings are nonspecific but may represent subacute infarcts  Anunderlying mass cannot be excluded    Recommend MRI of the brain with contrast to include diffusion-weighted imaging to help differentiate infarct from mass   Family/Caregiver Present No   Cognition   Overall Cognitive Status WFL   Arousal/Participation Alert   Orientation Level Oriented X4   Memory Within functional limits   Following Commands Follows all commands and directions without difficulty   RUE Strength   RUE Overall Strength Within Functional Limits - strength 5/5   LUE Strength   LUE Overall Strength Within Functional Limits - strength 5/5   Strength RLE   R Hip Flexion 5/5   R Knee Extension 4+/5   R Ankle Dorsiflexion 5/5   Tone RLE   RLE Tone WFL   Strength LLE   L Hip Flexion 5/5   L Knee Extension 4+/5   L Ankle Dorsiflexion 5/5   Tone LLE   LLE Tone WFL   Coordination   Movements are Fluid and Coordinated 1  (heel-shin, thumb-finger, dysdiadokinesis WFL)   Sensation (vision and hearing)   Light Touch   RLE Light Touch Grossly intact  (as well as UE)   LLE Light Touch Grossly intact  (as well as UE)   Bed Mobility   Supine to Sit 7  Independent   Additional items Assist x 1   Sit to Supine 7  Independent   Additional items Assist x 1   Transfers   Sit to Stand 7  Independent   Additional items Assist x 1   Stand to Sit 7  Independent   Additional items Assist x 1   Ambulation/Elevation   Gait pattern WNL   Gait Assistance 7  Independent   Additional items Assist x 1   Assistive Device None   Distance 40' in room   Stair Management Assistance Not tested;  Pt verbalizes not anticipating difficulty upon DC   Balance   Static Sitting Normal   Static Standing Normal   Ambulatory Normal   Higher level balance (TUG= 9 seconds)   Endurance Deficit   Endurance Deficit No   Activity Tolerance   Activity Tolerance Patient tolerated treatment well   Nurse Made Aware spoke to [facility]   Assessment   Prognosis Excellent   Problem List Pain   Barriers to Discharge None   Goals   Patient Goals to go home   Treatment Day 0   Plan   Treatment/Interventions Spoke to nursing   PT Frequency Other (Comment)  (DC from IPPT services)   Recommendation   Recommendation Home with family support;D/C PT   Equipment Recommended Other (Comment)  (none)   Barthel Index   Feeding 10   Bathing 5   Grooming Score 5   Dressing Score 10   Bladder Score 10   Bowels Score 10   Toilet Use Score 10   Transfers (Bed/Chair) Score 15   Mobility (Level Surface) Score 15   Stairs Score 0  (NT)   Barthel Index Score 90   (Please find full objective findings from PT assessment regarding body systems outlined above)  Assessment: Pt is a 61 y o  female seen for PT evaluation s/p admit [facility] on 10/7/2017 w/ Symptoms of cerebrovascular accident (CVA)  Order placed for PT  Prior to admission, pt was independent w/ all functional mobility w/ o device and works full time  Upon evaluation: Pt requires no physical A/ Indep for bed mobility, transfers, and ambulation  Pt NT on stairs and does not anticipate difficulty upon DC   Impairments and limitations also listed above, especially due to  pain  The following objective measures performed on IE also reveal limitations: Barthel Index 90/100; Modified [place] 1 (no significant disability); Timed Up and Go: 9 seconds (minimal to no risk for falls)  Pt's clinical presentation is currently stable  seen in pt's presentation of indep mobility levels in room, with pt c/o only dull L sided headache, plus intermittent HTN episodes and hyponaturemia   From PT/mobility standpoint, recommendation at time of d/c would be anticipate no needs and home with family support  No further IPPT indicated at this time  DC from IPPT    Comorbidities affecting pt's physical performance at time of assessment include: none  Personal factors affecting pt at time of IE include: environment      Claudio Clifford, PT, DPT

## 2017-10-08 NOTE — SOCIAL WORK
LOS 0  Patient not a bundle, not a readmit  CM met with Pt at bedside as there was a consult for Ischemic stroke  Pt lives in [phone] [place] [address] with her  in a split level home with a few SAMSON  Pt does not have a history of DME, HHC, or Rehab  Pt is independent with ambulation and ADL's  Pt uses [facility] and has Rx coverage and can afford prescriptions  Pt reports no MH or D&A history  Pt does not have a POA and does not want information  Pt works part time and transports herself to appointments  CM dept will follow Pt until discharge    DCP: home, no needs

## 2017-10-08 NOTE — PROGRESS NOTES
[facility] Progress Note  Patient: Jamia Arnold 61 y o  female   MRN: [id number]  PCP: Melinda Rod DO  Unit/Bed#: -01 Encounter: [id number]  Date Of Visit: 10/08/17    Assessment:    Principal Problem:    Symptoms of cerebrovascular accident (CVA)      Plan:    · Stroke 2 lesions noted right frontal area - among the differentials cardiac emboli versus hypercoagulable state versus others lesions, follow up on MRI brain, CTA, 2D echo continue aspirin and Lipitor 40 mg daily, discussed with Neurology  · History of factor 5 Leiden mutation      VTE Pharmacologic Prophylaxis:   Pharmacologic: Enoxaparin (Lovenox)  Mechanical VTE Prophylaxis in Place: Yes    Patient Centered Rounds: I have performed bedside rounds with nursing staff today  Discussions with Specialists or Other Care Team Provider:  Neurology    Education and Discussions with Family / Patient:  Discussed with the patient and her  was at bedside in detail    Time Spent for Care: 30 minutes  More than 50% of total time spent on counseling and coordination of care as described above  Current Length of Stay: 0 day(s)    Current Patient Status: Inpatient   Certification Statement: The patient, admitted on an observation basis, will now require > 2 midnight hospital stay due to Stroke requiring further evaluation as outlined    Discharge Plan / Estimated Discharge Date:  Stroke requiring further evaluation as outlined    Code Status: Level 1 - Full Code      Subjective:     Overnight no focal weakness  Comfortably sitting up in chair  Discussed in detail CT scan findings and differentials      Objective:     Vitals:   Temp (24hrs), Av 2 °F (36 8 °C), Min:97 6 °F (36 4 °C), Max:98 4 °F (36 9 °C)    HR:  [52-69] 69  Resp:  [18] 18  BP: (101-182)/(55-90) 124/56  SpO2:  [95 %-100 %] 97 %  Body mass index is 24 91 kg/m²       Input and Output Summary (last 24 hours):     No intake or output data in the 24 hours ending 10/08/17 1305    Physical Exam:     Physical Exam     Comfortably sitting up in chair  Neck supple  Lungs clear to auscultation  Heart sounds regular no murmurs  Abdomen soft nontender  Pulses present  No pedal edema  Awake and alert    Additional Data:     Labs:      Results from last 7 days  Lab Units 10/07/17  1452   WBC Thousand/uL 9 20   HEMOGLOBIN g/dL 13 5   HEMATOCRIT % 41 2   PLATELETS Thousands/uL 350  350   NEUTROS PCT % 50   LYMPHS PCT % 36   MONOS PCT % 10   EOS PCT % 4       Results from last 7 days  Lab Units 10/07/17  1452   SODIUM mmol/L 135*   POTASSIUM mmol/L 3 7   CHLORIDE mmol/L 103   CO2 mmol/L 28   BUN mg/dL 14   CREATININE mg/dL 0 78   CALCIUM mg/dL 9 0   TOTAL PROTEIN g/dL 7 3   BILIRUBIN TOTAL mg/dL 0 40   ALK PHOS U/L 69   ALT U/L 32   AST U/L 26   GLUCOSE RANDOM mg/dL 90       Results from last 7 days  Lab Units 10/07/17  1452   INR  0 86       * I Have Reviewed All Lab Data Listed Above  * Additional Pertinent Lab Tests Reviewed: All Labs Within Last 24 Hours Reviewed    Imaging:    Imaging Reports Reviewed Today Include:   Imaging Personally Reviewed by Myself Includes:  CT head    Recent Cultures (last 7 days):           Last 24 Hours Medication List:     aspirin 81 mg Oral Daily   atorvastatin 40 mg Oral QPM   enoxaparin 40 mg Subcutaneous Daily        Today, Patient Was Seen By: Christiano Naik MD    ** Please Note: This note has been constructed using a voice recognition system   **

## 2017-10-08 NOTE — PLAN OF CARE
Problem: DISCHARGE PLANNING - CARE MANAGEMENT  Goal: Discharge to post-acute care or home with appropriate resources  INTERVENTIONS:  - Conduct assessment to determine patient/family and health care team treatment goals, and need for post-acute services based on payer coverage, community resources, and patient preferences, and barriers to discharge  - Address psychosocial, clinical, and financial barriers to discharge as identified in assessment in conjunction with the patient/family and health care team  - Arrange appropriate level of post-acute services according to patients   needs and preference and payer coverage in collaboration with the physician and health care team  - Communicate with and update the patient/family, physician, and health care team regarding progress on the discharge plan  - Arrange appropriate transportation to post-acute venues  Outcome: Progressing  LOS 0  Patient not a bundle, not a readmit  CM met with Pt at bedside as there was a consult for Ischemic stroke  Pt lives in [phone] [place] [address] with her  in a split level home with a few SAMSON  Pt does not have a history of DME, HHC, or Rehab  Pt is independent with ambulation and ADL's  Pt uses [facility] pharmacy and has Rx coverage and can afford prescriptions  Pt reports no MH or D&A history  Pt does not have a POA and does not want information  Pt works part time and transports herself to appointments  CM dept will follow Pt until discharge    DCP: home, no needs

## 2017-10-09 ENCOUNTER — APPOINTMENT (INPATIENT)
Dept: NON INVASIVE DIAGNOSTICS | Facility: HOSPITAL | Age: 57
DRG: 066 | End: 2017-10-09
Payer: COMMERCIAL

## 2017-10-09 ENCOUNTER — GENERIC CONVERSION - ENCOUNTER (OUTPATIENT)
Dept: OTHER | Facility: OTHER | Age: 57
End: 2017-10-09

## 2017-10-09 VITALS
BODY MASS INDEX: 24.94 KG/M2 | WEIGHT: 149.69 LBS | HEIGHT: 65 IN | RESPIRATION RATE: 18 BRPM | SYSTOLIC BLOOD PRESSURE: 127 MMHG | HEART RATE: 63 BPM | TEMPERATURE: 98.3 F | DIASTOLIC BLOOD PRESSURE: 68 MMHG | OXYGEN SATURATION: 100 %

## 2017-10-09 PROBLEM — I63.9 ISCHEMIC STROKE OF FRONTAL LOBE (HCC): Status: ACTIVE | Noted: 2017-10-09

## 2017-10-09 LAB
DEPRECATED AT III PPP: 118 % OF NORMAL (ref 92–136)
PLATELET # BLD AUTO: 351 THOUSANDS/UL (ref 149–390)
PMV BLD AUTO: 10.5 FL (ref 8.9–12.7)
PROT C AG ACT/NOR PPP IA: 122 % OF NORMAL (ref 60–150)

## 2017-10-09 PROCEDURE — 85732 THROMBOPLASTIN TIME PARTIAL: CPT | Performed by: NURSE PRACTITIONER

## 2017-10-09 PROCEDURE — 85705 THROMBOPLASTIN INHIBITION: CPT | Performed by: NURSE PRACTITIONER

## 2017-10-09 PROCEDURE — 85300 ANTITHROMBIN III ACTIVITY: CPT | Performed by: NURSE PRACTITIONER

## 2017-10-09 PROCEDURE — 85049 AUTOMATED PLATELET COUNT: CPT | Performed by: INTERNAL MEDICINE

## 2017-10-09 PROCEDURE — 86146 BETA-2 GLYCOPROTEIN ANTIBODY: CPT | Performed by: NURSE PRACTITIONER

## 2017-10-09 PROCEDURE — 85306 CLOT INHIBIT PROT S FREE: CPT | Performed by: NURSE PRACTITIONER

## 2017-10-09 PROCEDURE — 93306 TTE W/DOPPLER COMPLETE: CPT

## 2017-10-09 PROCEDURE — 81241 F5 GENE: CPT | Performed by: NURSE PRACTITIONER

## 2017-10-09 PROCEDURE — 85670 THROMBIN TIME PLASMA: CPT | Performed by: NURSE PRACTITIONER

## 2017-10-09 PROCEDURE — 86147 CARDIOLIPIN ANTIBODY EA IG: CPT | Performed by: NURSE PRACTITIONER

## 2017-10-09 PROCEDURE — 81240 F2 GENE: CPT | Performed by: NURSE PRACTITIONER

## 2017-10-09 PROCEDURE — 85305 CLOT INHIBIT PROT S TOTAL: CPT | Performed by: NURSE PRACTITIONER

## 2017-10-09 PROCEDURE — 85303 CLOT INHIBIT PROT C ACTIVITY: CPT | Performed by: NURSE PRACTITIONER

## 2017-10-09 PROCEDURE — 85613 RUSSELL VIPER VENOM DILUTED: CPT | Performed by: NURSE PRACTITIONER

## 2017-10-09 RX ORDER — ATORVASTATIN CALCIUM 40 MG/1
40 TABLET, FILM COATED ORAL EVERY EVENING
Qty: 30 TABLET | Refills: 0 | Status: SHIPPED | OUTPATIENT
Start: 2017-10-09 | End: 2019-02-05 | Stop reason: ALTCHOICE

## 2017-10-09 RX ADMIN — ASPIRIN 81 MG 81 MG: 81 TABLET ORAL at 08:49

## 2017-10-09 NOTE — PLAN OF CARE
NEUROSENSORY - ADULT     Achieves stable or improved neurological status Adequate for Discharge     Achieves maximal functionality and self care Adequate for Discharge        Nutrition/Hydration-ADULT     Nutrient/Hydration intake appropriate for improving, restoring or maintaining nutritional needs Adequate for Discharge

## 2017-10-09 NOTE — DISCHARGE INSTRUCTIONS
Stroke   WHAT YOU NEED TO KNOW:   A stroke happens when blood flow to part of the brain is interrupted  This can cause serious brain damage from a lack of oxygen  Brain function may be affected depending on where the stroke happens  A stroke caused by a blood clot is called an ischemic stroke  Ischemic stroke is the most common type  A stroke caused by a burst or torn blood vessel is called a hemorrhagic stroke  When stroke symptoms go away completely within minutes to hours and do not cause damage, it is called a transient ischemic attack (TIA)  A TIA is a warning sign that you are at risk of soon having a stroke  DISCHARGE INSTRUCTIONS:   Call 911 for any of the following:   · You have any of the following signs of a stroke:      ¨ Numbness or drooping on one side of your face     ¨ Weakness in an arm or leg    ¨ Confusion or difficulty speaking    ¨ Dizziness, a severe headache, or vision loss    ·            · You have a seizure  · You feel lightheaded, short of breath, and have chest pain  Seek care immediately if:   · Your blood sugar level or blood pressure is higher or lower than usual     · You have trouble swallowing  · You fall  Contact your healthcare provider if:   · You have trouble having a bowel movement or urinating  · You have questions or concerns about your condition or care  Medicines:  Medicines will depend on the kind of stroke you had  You may need any of the following:  · Medicines  may be given to treat high cholesterol, high blood pressure, or diabetes  · Antiplatelets , such as aspirin, help prevent blood clots  Take your antiplatelet medicine exactly as directed  These medicines make it more likely for you to bleed or bruise  If you are told to take aspirin, do not take acetaminophen or ibuprofen instead  · Blood thinners    help prevent blood clots  Examples of blood thinners include heparin and warfarin   Clots can cause strokes, heart attacks, and death  The following are general safety guidelines to follow while you are taking a blood thinner:    ¨ Watch for bleeding and bruising while you take blood thinners  Watch for bleeding from your gums or nose  Watch for blood in your urine and bowel movements  Use a soft washcloth on your skin, and a soft toothbrush to brush your teeth  This can keep your skin and gums from bleeding  If you shave, use an electric shaver  Do not play contact sports  ¨ Tell your dentist and other healthcare providers that you take anticoagulants  Wear a bracelet or necklace that says you take this medicine  ¨ Do not start or stop any medicines unless your healthcare provider tells you to  Many medicines cannot be used with blood thinners  ¨ Tell your healthcare provider right away if you forget to take the medicine, or if you take too much  ¨ Warfarin  is a blood thinner that you may need to take  The following are things you should be aware of if you take warfarin  § Foods and medicines can affect the amount of warfarin in your blood  Do not make major changes to your diet while you take warfarin  Warfarin works best when you eat about the same amount of vitamin K every day  Vitamin K is found in green leafy vegetables and certain other foods  Ask for more information about what to eat when you are taking warfarin  § You will need to see your healthcare provider for follow-up visits when you are on warfarin  You will need regular blood tests  These tests are used to decide how much medicine you need  · Take your medicine as directed  Contact your healthcare provider if you think your medicine is not helping or if you have side effects  Tell him or her if you are allergic to any medicine  Keep a list of the medicines, vitamins, and herbs you take  Include the amounts, and when and why you take them  Bring the list or the pill bottles to follow-up visits   Carry your medicine list with you in case of an emergency  Know the warning signs of a stroke: The word F A S T  can help you remember and recognize warning signs of a stroke  · F = Face:  One side of the face droops  · A = Arms:  One arm starts to drop when both arms are raised  · S = Speech:  Speech is slurred or sounds different than usual     · T = Time:  A person who is having a stroke needs to be seen immediately  A stroke is a medical emergency that needs immediate treatment  Most medicines and treatments work best the sooner they are given  ·        Follow up with your healthcare provider as directed: You may need to come in for regular tests of your brain function  If you are taking warfarin, you will need to come in for regular blood tests  Your INR levels will also need to be checked  These tests help make sure you are taking the right amount of warfarin  Write down your questions so you remember to ask them during your visits  Go to rehabilitation (rehab) as directed:  Rehab is an important part of treatment  A speech therapist can help you relearn or improve your ability to talk and swallow  You may start slowly and start doing more difficult tasks over time  Physical therapists can help you gain strength and build endurance  Occupational therapists can teach you new ways to do daily activities, such as getting dressed  Therapy can help you improve your ability to walk or keep your balance  Your therapy may include tasks or movements you will need to do for everyday activities  An example is being able to raise or lower yourself from a chair  Care for yourself after a stroke:   · Make your home safe  Remove anything you might trip over  Tape electrical cords down  Keep paths clear throughout your home  Make sure your home is well lighted  Put nonslip materials on surfaces that might be slippery  An example is your bathtub or shower floor  · Use assistive devices    A cane or walker may help you keep your balance as you walk   Prevent another stroke:   · Manage health conditions  Conditions such as atrial fibrillation and diabetes can increase your risk for a stroke  Control your glucose carefully if you have hyperglycemia or diabetes  · Check your blood pressure as directed  High blood pressure can increase your risk for a stroke  If you have high blood pressure, follow your healthcare provider's directions for controlling your blood pressure  · Do not smoke  Nicotine and other chemicals in cigarettes and cigars can increase your risk for another stroke and cause lung damage  Ask your healthcare provider for information if you currently smoke and need help to quit  E-cigarettes or smokeless tobacco still contain nicotine  Talk to your healthcare provider before you use these products  · Do not drink alcohol  Alcohol can increase your risk for a stroke  Alcohol may also increase your blood pressure or thin your blood  Blood thinning can increase your risk for hemorrhagic stroke  · Eat a variety of healthy foods  Healthy foods include whole-grain breads, low-fat dairy products, beans, lean meats, and fish  Eat at least 5 servings of fruits and vegetables each day  Choose foods that are low in fat, cholesterol, salt, and sugar  Eat foods that are high in potassium, such as potatoes and bananas  · Exercise as directed  Activity is important for preventing another stroke  You may need to work with an exercise therapist to learn how to exercise safely  Exercise may help you be able to do your normal activities more easily  Exercise also helps control your blood pressure and weight  · Maintain a healthy weight  Ask your healthcare provider how much you should weigh  Ask him or her to help you create a weight loss plan if you are overweight  Ask about the best exercise plan for you  · Manage stress  Stress can increase your blood pressure   Find new ways to relax, such as deep breathing or listening to music  What you need to know about depression:  Depression can happen after a stroke  Talk to your healthcare provider if you have depression that continues or is getting worse  Your provider may be able to help treat your depression  Your provider can also recommend support groups for you to join  A support group is a place to talk with others who have had a stroke  It may also help to talk to friends and family members about how you are feeling  Tell your family and friends that if they see these signs, to let your healthcare provider know  You may show any of the following signs of depression:  · Extreme sadness    · Avoiding social interaction with family or friends    · A lack of interest in things you once enjoyed    · Irritability    · Trouble sleeping    · Low energy levels    · A change in eating habits or sudden weight gain or loss  For support and more information:   · National Stroke Association  [address] , [place] [place] [address]  Phone: 2- 070 - 209-3975  Web Address: [employer]   org  © 2017 [address] Information is for End User's use only and may not be sold, redistributed or otherwise used for commercial purposes  All illustrations and images included in CareNotes® are the copyrighted property of A D A M , Inc  or [employer]  The above information is an  only  It is not intended as medical advice for individual conditions or treatments  Talk to your doctor, nurse or pharmacist before following any medical regimen to see if it is safe and effective for you  Apixaban (By mouth)   Apixaban (a-PIX-a-ban)  Treats and prevents blood clots  This medicine is a blood thinner  Brand Name(s): Eliquis   There may be other brand names for this medicine  When This Medicine Should Not Be Used: This medicine is not right for everyone  Do not use it if you had an allergic reaction to apixaban or you have active bleeding    How to Use This Medicine:   Tablet  · Your doctor will tell you how much medicine to use  Do not use more than directed  · If you are not able to swallow the tablets whole, they may be crushed and mixed in water, 5% dextrose in water (D5W), apple juice, or applesauce  The crushed tablets may be mixed with 60 mL of water or D5W dose and given through a nasogastric tube (NGT)  · This medicine should come with a Medication Guide  Ask your pharmacist for a copy if you do not have one  · Missed dose: Take a dose as soon as you remember  If it is almost time for your next dose, wait until then and take a regular dose  Do not take extra medicine to make up for a missed dose  · Store the medicine in a closed container at room temperature, away from heat, moisture, and direct light  Drugs and Foods to Avoid:   Ask your doctor or pharmacist before using any other medicine, including over-the-counter medicines, vitamins, and herbal products  · Some medicines can affect how apixaban works  Tell your doctor if you are using any of the following:   ¨ Carbamazepine, clarithromycin, itraconazole, ketoconazole, phenytoin, rifampin, ritonavir, Kathy's wort  ¨ Blood thinner (including clopidogrel, heparin, prasugrel, warfarin)  ¨ Medicine to treat depression  ¨ NSAID pain or arthritis medicine (including aspirin, celecoxib, diclofenac, ibuprofen, naproxen)  Warnings While Using This Medicine:   · Tell your doctor if you are pregnant or breastfeeding, or if you have kidney disease, liver disease, bleeding problems, or an artificial heart valve  · Do not stop using this medicine suddenly without asking your doctor  You might have a higher risk of stroke for a short time after you stop using this medicine  · This medicine increases your risk for bleeding that can become serious if not controlled  You may also bruise easily, and it may take longer than usual for bleeding to stop    · This medicine may increase your risk for blood clots in your spine or back if you undergo an epidural or spinal puncture  This could lead to paralysis  Tell your doctor if you ever had spine problems or back surgery  · Tell any doctor or dentist who treats you that you are using this medicine  With your doctor's supervision, you may need to stop using this medicine several days before you have surgery or medical tests  · Your doctor will do lab tests at regular visits to check on the effects of this medicine  Keep all appointments  · Keep all medicine out of the reach of children  Never share your medicine with anyone  Possible Side Effects While Using This Medicine:   Call your doctor right away if you notice any of these side effects:  · Allergic reaction: Itching or hives, swelling in your face or hands, swelling or tingling in your mouth or throat, chest tightness, trouble breathing  · Change in how much or how often you urinate, red or pink urine  · Chest pain, trouble breathing  · Coughing up blood, vomiting blood or material that looks like coffee grounds  · Numbness, tingling, or muscle weakness in your legs or feet  · Red or black, tarry stools  · Unusual bleeding, bruising, or weakness  If you notice other side effects that you think are caused by this medicine, tell your doctor  Call your doctor for medical advice about side effects  You may report side effects to FDA at 9-763-FDA-0841  © 2017 [facility] Information is for End User's use only and may not be sold, redistributed or otherwise used for commercial purposes  The above information is an  only  It is not intended as medical advice for individual conditions or treatments  Talk to your doctor, nurse or pharmacist before following any medical regimen to see if it is safe and effective for you  Atorvastatin (By mouth)   Atorvastatin (a-tor-va-STAT-in)  Treats high cholesterol and triglyceride levels   Reduces the risk of angina, stroke, heart attack, or certain heart and blood vessel problems  This medicine is a statin  Brand Name(s): Lipitor   There may be other brand names for this medicine  When This Medicine Should Not Be Used: This medicine is not right for everyone  Do not use it if you had an allergic reaction to atorvastatin, if you have active liver disease, or if you are pregnant or breastfeeding  How to Use This Medicine:   Tablet  · Take your medicine as directed  Your dose may need to be changed several times to find what works best for you  · Take this medicine at the same time each day  · Swallow the tablet whole  Do not break it  · Missed dose: Take a dose as soon as you remember  If it is less than 12 hours until your next dose, skip the missed dose and take the next dose at the regular time  Do not take 2 doses of this medicine within 12 hours  · Read and follow the patient instructions that come with this medicine  Talk to your doctor or pharmacist if you have any questions  · Store the medicine in a closed container at room temperature, away from heat, moisture, and direct light  Drugs and Foods to Avoid:   Ask your doctor or pharmacist before using any other medicine, including over-the-counter medicines, vitamins, and herbal products  · Some medicines can affect how atorvastatin works  Tell your doctor if you also use birth control pills, boceprevir, cimetidine, colchicine, cyclosporine, digoxin, niacin, rifampin, spironolactone, telaprevir, medicine to treat an infection, or medicine to treat HIV/AIDS  Warnings While Using This Medicine:   · It is not safe to take this medicine during pregnancy  It could harm an unborn baby  Tell your doctor right away if you become pregnant  · Tell your doctor if you have kidney disease, diabetes, muscle pain or weakness, thyroid problems, have recently had a stroke or TIA (transient ischemic attack), or have a history of liver disease   Tell your doctor if you drink grapefruit juice or drink alcohol regularly  · This medicine can cause muscle problems, which can lead to kidney problems  · Tell any doctor or dentist who treats you that you use this medicine  You may need to stop using it if you have surgery, have an injury, or develop serious health problems  · Your doctor will do lab tests at regular visits to check on the effects of this medicine  Keep all appointments  · Keep all medicine out of the reach of children  Never share your medicine with anyone  Possible Side Effects While Using This Medicine:   Call your doctor right away if you notice any of these side effects:  · Allergic reaction: Itching or hives, swelling in your face or hands, swelling or tingling in your mouth or throat, chest tightness, trouble breathing  · Blistering, peeling, red skin rash  · Change in how much or how often you urinate  · Dark urine or pale stools, nausea, vomiting, loss of appetite, stomach pain, yellow skin or eyes  · Fever  · Muscle pain, tenderness, or weakness  · Unusual tiredness  If you notice these less serious side effects, talk with your doctor:   · Diarrhea  · Joint pain  If you notice other side effects that you think are caused by this medicine, tell your doctor  Call your doctor for medical advice about side effects  You may report side effects to FDA at 7-813-FDA-8218  © 2017 [address] Information is for End User's use only and may not be sold, redistributed or otherwise used for commercial purposes  The above information is an  only  It is not intended as medical advice for individual conditions or treatments  Talk to your doctor, nurse or pharmacist before following any medical regimen to see if it is safe and effective for you

## 2017-10-09 NOTE — PROGRESS NOTES
Progress Note - Neurology   Laina Malone 61 y o  female MRN: [id number]  Unit/Bed#: -01 Encounter: [id number]        Assessment/Plan :  1  New acute right frontal MCA CVA likely etiology is that of her Factor 5 Leiden disease given that her other stroke risk factors that her more traditionally assessed are not present in this 48-year-old woman  Our recommendation at this time is that she be started on an anticoagulant and a new oral anticoagulant would be satisfactory and she should also follow up with Hematology Oncology for possible comprehensive counseling in regard to whether not she should be genetically tested to determine if she is a homozygote or heterozygote in relation to her Factor 5 Leiden as that may determine or affect whether not she is a transient her lifelong anticoagulant patient  2  Factor 5 Leiden positive- the patient reports that her sisters and her daughter are all factor 5 Leiden  There is variability within the sisters as to their presenting symptoms and their need for long-term anticoagulation  Plan: We have drawn a thrombosis panel to begin to verify that she is a factor 5 Leiden patient  The remainder of her workup including her echocardiogram is pending at this time  We have discussed with her of potential other stroke or thrombotic events that she should present to the hospital for  She and her  had several questions all of which I believe were answered to their satisfaction  Subjective: It really feels as if it is better already    ROS: 12 system cued query was improved in terms of the function of her hand  She reports her facial weakness was only very transient  She reports that she feels otherwise fine she has no complaints of headache visual disturbance or any other neurologicly oriented complaint  She denies chest pain shortness of breath no palpitations no arthritis no of falls no bladder or bowel complaints  No nausea vomiting        Vitals: Blood pressure 92/51, pulse (!) 49, temperature 97 8 °F (36 6 °C), temperature source Oral, resp  rate 16, height 5' 5" (1 651 m), weight 67 9 kg (149 lb 11 1 oz), SpO2 98 %  ,Body mass index is 24 91 kg/m²  MEDS:    aspirin 81 mg Oral Daily   atorvastatin 40 mg Oral QPM   enoxaparin 40 mg Subcutaneous Daily   We are going to DC her aspirin and start her on Eliquis  Physical Exam:    Alley Brasher seen in:  In her room her 's the bedside she is ambulating independently around the room  General appearance: alert, appears stated age and cooperative  Head: Normocephalic, without obvious abnormality, atraumatic  Oral exam: lips, mucosa, and tongue moist; teeth and gums normal  Neck: no carotid bruit   Lungs: clear to auscultation ant  bilaterally  Heart: regular rate and rhythm, normal S1, S2, no murmur appreciated  Abdomen: soft NTND    Extremities: atraumatic, no cyanosis or edema    Neurologic:   Mental status: Alert, completely oriented, spontaneous conversation and thought content appropriate  CN: Exam EOM's I, Gaze conjugate  Non lateralizing sensory & motor exam, (PP not tested on face)  Reminder [id number]-[id number] normal    Motor: full power age appropriate x 4 limbs her left-handed digit extension and abduction were well recovered without deficit and equal to her right  Sensory: intact  X 4 limbs 4 mod inc vib and prop, PP not tested  Cerebellar: no ataxia  No past pointing w pronation  Fine motor inc finger taps age appropriate  Gait: Fluid smooth, no LOB w cadance or directional change  She was able to straight and reverse tandem as well  DTR's: Age appropriate,  Plantars: downgoing       Lab Results:   I have personally reviewed pertinent reports    , CBC:   Results from last 7 days  Lab Units 10/09/17  1234 10/07/17  1452   WBC Thousand/uL  --  9 20   RBC Million/uL  --  4 50   HEMOGLOBIN g/dL  --  13 5   HEMATOCRIT %  --  41 2   MCV fL  --  92   PLATELETS Thousands/uL 351 350  350   , BMP/CMP:   Results from last 7 days  Lab Units 10/07/17  1452   SODIUM mmol/L 135*   POTASSIUM mmol/L 3 7   CHLORIDE mmol/L 103   CO2 mmol/L 28   ANION GAP mmol/L 4   BUN mg/dL 14   CREATININE mg/dL 0 78   GLUCOSE RANDOM mg/dL 90   CALCIUM mg/dL 9 0   AST U/L 26   ALT U/L 32   ALK PHOS U/L 69   TOTAL PROTEIN g/dL 7 3   ALBUMIN g/dL 3 8   BILIRUBIN TOTAL mg/dL 0 40   EGFR ml/min/1 73sq m 84   , Vitamin B12:   , HgBA1C:   Results from last 7 days  Lab Units 10/08/17  0605   HEMOGLOBIN A1C % 5 8   , TSH:       Invalid input(s): TSH, Lipid w reflex dir LDL:   Results from last 7 days  Lab Units 10/08/17  0605   CHOLESTEROL mg/dL 193   HDL mg/dL 71*   TRIGLYCERIDES mg/dL 83    I note particularly HDL calculated is 100    Imaging Studies: I have personally reviewed pertinent films in PACS I note particularly the area of ischemia noted in the right frontal MCA territory  There is no hemorrhagic conversion noted  Her CTA was also patent intra and extra cranially  EEG, Pathology, and Other Studies: Her echocardiogram has been completed but the report is pending  Counseling / Coordination of Care  Total time spent today 75 minutes  Greater than 50% of total time was spent with the patient and / or family counseling and / or coordination of care  A description of the counseling / coordination of care: All of the above was discussed in great detail with the patient and her  at the bedside  They had multiple questions some of which we were able to answer a however she had several questions concerning the genetics of her Factor 5 Leiden the variability of information she has received from her sisters who also some are factor 5 Leiden concerning anticoagulation and is assess city of short-term verses long-term  All of these things were discussed her in detail  Dictation voice to text software has been used in the creation of this document  Please consider this in light of any contextual or grammatical errors

## 2017-10-09 NOTE — CASE MANAGEMENT
Continued Stay Review    Date: 10/09/2017    Vital Signs: BP 92/51   Pulse (!) 49   Temp 97 8 °F (36 6 °C) (Oral)   Resp 16   Ht 5' 5" (1 651 m)   Wt 67 9 kg (149 lb 11 1 oz)   SpO2 98%   BMI 24 91 kg/m²     Medications:   Scheduled Meds:   aspirin 81 mg Oral Daily   atorvastatin 40 mg Oral QPM   enoxaparin 40 mg Subcutaneous Daily     Continuous Infusions:    PRN Meds:     Abnormal Labs/Diagnostic Results: Na 135    MRI of Brain: Acute or subacute right frontal infarction measuring 2 5 x 1 3 cm  CTA Head/Neck:  IMPRESSION:     No significant carotid or vertebral artery stenosis      No focal intracranial stenosis or aneurysm      Recent infarct right frontal deep white matter      Heterogeneous thyroid gland containing numerous small nodules  Age/Sex: 61 y o  female     Assessment/Plan:     Plan:     · Acute/subacute right frontal infarction - differentials include cardioembolic versus hypercoagulable state follow up on 2D echo she has a strong family history of factor 5 Leiden mutation, discussed with Neurology will await further input regarding anticoagulation versus antiplatelet, continue Lipitor  · Pre diabetes A1c 5 8 therapeutic lifestyle modification  · Family history of factor 5 Leiden mutation lower extremity Dopplers negative for DVT, follow up on hypercoagulable studies        VTE Pharmacologic Prophylaxis:   Pharmacologic: Enoxaparin (Lovenox)  Mechanical VTE Prophylaxis in Place:  Yes     Current Length of Stay: 1 day(s)     Current Patient Status: Inpatient   Certification Statement: The patient will continue to require additional inpatient hospital stay due to As mentioned

## 2017-10-09 NOTE — DISCHARGE SUMMARY
Discharge Summary - [facility] Internal Medicine    Patient Information: Socrates Mercado 61 y o  female MRN: [id number]  Unit/Bed#: -01 Encounter: [id number]    Discharging Physician / Practitioner: Darwin Curry MD  PCP: Anthony Basurto DO  Admission Date: 10/7/2017  Discharge Date: 10/09/17    Reason for Admission:  Left upper extremity distal weakness and facial droop    Discharge Diagnoses:     Principal Problem:    Ischemic stroke of frontal lobe [facility])  Active Problems:    Symptoms of cerebrovascular accident (CVA)  Resolved Problems:    * No resolved hospital problems  *      Consultations During Hospital Stay:  · Neurology    Procedures Performed:     · CT scan low density lesions noted right frontal area  · MRI brain acute to subacute right frontal infarction measuring 2 5 x 1 3 cm  · CTA head and neck no significant carotid or vertebral artery stenosis, heterogeneous thyroid gland numerous small nodules  · Lower extremity venous Doppler negative for DVT  · 2D echo EF 89%, grade 1 diastolic dysfunction      Hospital Course:     Socrates Mercado is a 61 y o  female patient who originally presented to the hospital on 10/7/2017 due to left hand weakness and left facial numbness and facial droop left side  The symptoms were transient and she recovered  Patient was admitted imaging studies revealed right frontal ischemic infarct  She has a strong family history of factor 5 Leiden mutation  Hypercoagulable panel sent from the ED is pending  She was evaluated by Neurology has been placed on Eliquis 5 mg b i d  and Lipitor 40 mg daily  She was incidentally noted numerous small nodules in her thyroid outpatient thyroid ultrasound and following up with primary care physician has been recommended    She remains hemodynamically stable, did not have any further neuro symptoms or deficits during her stay in the hospital and is deemed ready for discharge today      Condition at Discharge: fair Discharge Day Visit / Exam:     * Please refer to separate progress note for these details *    Discussion with Family:  Discharge plan discussed with Neurology  Discharge plan discussed with the patient and her  in detail, multiple questions answered  Outpatient follow-up with Neurology and primary care physician as outlined in discharge instructions      Discharge instructions/Information to patient and family:   See after visit summary for information provided to patient and family  Provisions for Follow-Up Care:  See after visit summary for information related to follow-up care and any pertinent home health orders  Disposition:     Home    For Discharges to [facility]:   · Not Applicable to this Patient - Not Applicable to this Patient    Planned Readmission: no    Discharge Statement:  I spent 55 minutes discharging the patient  This time was spent on the day of discharge  I had direct contact with the patient on the day of discharge  Greater than 50% of the total time was spent examining patient, answering all patient questions, arranging and discussing plan of care with patient as well as directly providing post-discharge instructions  Additional time then spent on discharge activities  Discharge Medications:  See after visit summary for reconciled discharge medications provided to patient and family  ** Please Note: This note has been constructed using a voice recognition system   **

## 2017-10-09 NOTE — SOCIAL WORK
CM received prescription for eliquis  CM provided Pt with coupon for 30 days free as well as $10 co-pay card

## 2017-10-09 NOTE — PROGRESS NOTES
[facility] Internal Medicine Progress Note  Patient: Olya Tran 61 y o  female   MRN: [id number]  PCP: Cassandra Brantley DO  Unit/Bed#: -01 Encounter: [id number]  Date Of Visit: 10/09/17    Assessment:    Principal Problem:    Ischemic stroke of frontal lobe ([address] )  Active Problems:    Symptoms of cerebrovascular accident (CVA)      Plan:    · Acute/subacute right frontal infarction - differentials include cardioembolic versus hypercoagulable state follow up on 2D echo she has a strong family history of factor 5 Leiden mutation, discussed with Neurology will await further input regarding anticoagulation versus antiplatelet, continue Lipitor  · Pre diabetes A1c 5 8 therapeutic lifestyle modification  · Family history of factor 5 Leiden mutation lower extremity Dopplers negative for DVT, follow up on hypercoagulable studies      VTE Pharmacologic Prophylaxis:   Pharmacologic: Enoxaparin (Lovenox)  Mechanical VTE Prophylaxis in Place: Yes    Patient Centered Rounds: I have performed bedside rounds with nursing staff today  Discussions with Specialists or Other Care Team Provider:  Neurology    Education and Discussions with Family / Patient:  Discussed with the patient and her  was at bedside in detail, questions answered    Time Spent for Care: 30 minutes  More than 50% of total time spent on counseling and coordination of care as described above      Current Length of Stay: 1 day(s)    Current Patient Status: Inpatient   Certification Statement: The patient will continue to require additional inpatient hospital stay due to As mentioned    Discharge Plan / Estimated Discharge Date:  Likely discharge today awaiting Neurology inputs    Code Status: Level 1 - Full Code      Subjective:     Comfortably sitting up in chair  Multiple questions - discussed with the patient along with the physician assistant of Neurology team      Objective:     Vitals:   Temp (24hrs), Av 2 °F (36 8 °C), Min:97 8 °F (36 6 °C), Max:98 7 °F (37 1 °C)    HR:  [49-70] 49  Resp:  [16-18] 16  BP: ()/(51-77) 92/51  SpO2:  [97 %-98 %] 98 %  Body mass index is 24 91 kg/m²  Input and Output Summary (last 24 hours): Intake/Output Summary (Last 24 hours) at 10/09/17 1147  Last data filed at 10/08/17 1830   Gross per 24 hour   Intake              180 ml   Output                0 ml   Net              180 ml       Physical Exam:     Physical Exam    Comfortably sitting up in chair  Neck supple  Lungs clear to auscultation  Heart sounds no murmurs appreciable  Abdomen soft nontender  Pulses present  No pedal edema  Awake alert obeys simple commands  No rash    Additional Data:     Labs:      Results from last 7 days  Lab Units 10/07/17  1452   WBC Thousand/uL 9 20   HEMOGLOBIN g/dL 13 5   HEMATOCRIT % 41 2   PLATELETS Thousands/uL 350  350   NEUTROS PCT % 50   LYMPHS PCT % 36   MONOS PCT % 10   EOS PCT % 4       Results from last 7 days  Lab Units 10/07/17  1452   SODIUM mmol/L 135*   POTASSIUM mmol/L 3 7   CHLORIDE mmol/L 103   CO2 mmol/L 28   BUN mg/dL 14   CREATININE mg/dL 0 78   CALCIUM mg/dL 9 0   TOTAL PROTEIN g/dL 7 3   BILIRUBIN TOTAL mg/dL 0 40   ALK PHOS U/L 69   ALT U/L 32   AST U/L 26   GLUCOSE RANDOM mg/dL 90       Results from last 7 days  Lab Units 10/07/17  1452   INR  0 86       * I Have Reviewed All Lab Data Listed Above  * Additional Pertinent Lab Tests Reviewed: All Labs Within Last 24 Hours Reviewed    Imaging:    Imaging Reports Reviewed Today Include:  Lower extremity Dopplers negative for DVT  Imaging Personally Reviewed by Myself Includes:  MRI brain    Recent Cultures (last 7 days):           Last 24 Hours Medication List:     aspirin 81 mg Oral Daily   atorvastatin 40 mg Oral QPM   enoxaparin 40 mg Subcutaneous Daily        Today, Patient Was Seen By: Fransisca Desir MD    ** Please Note: This note has been constructed using a voice recognition system   **

## 2017-10-09 NOTE — INCIDENTAL FINDINGS
The following findings require follow up:  Radiographic finding   Finding:  Multiple small thyroid nodules   Follow up required:  Thyroid ultrasound   Follow up should be done within 2-3 week(s)    Please notify the following clinician to assist with the follow up:   Dr Carmen Salmeron primary care physician

## 2017-10-09 NOTE — OCCUPATIONAL THERAPY NOTE
Occupational Therapy Screen        Patient Name: Zoya BEARDEN Date: 10/9/2017    OT orders received and chart review completed  Pt completing ADL's at baseline level of I  No acute care OT needs at this time  D/C OT  RN, Ana Caldera aware      Carlos Guzman, OT

## 2017-10-09 NOTE — PLAN OF CARE
Problem: Nutrition/Hydration-ADULT  Goal: Nutrient/Hydration intake appropriate for improving, restoring or maintaining nutritional needs  Monitor and assess patient's nutrition/hydration status for malnutrition (ex- brittle hair, bruises, dry skin, pale skin and conjunctiva, muscle wasting, smooth red tongue, and disorientation)  Collaborate with interdisciplinary team and initiate plan and interventions as ordered  Monitor patient's weight and dietary intake as ordered or per policy  Utilize nutrition screening tool and intervene per policy  Determine patient's food preferences and provide high-protein, high-caloric foods as appropriate  INTERVENTIONS:  - Monitor oral intake, urinary output, labs, and treatment plans  - Assess nutrition and hydration status and recommend course of action  - Evaluate amount of meals eaten  - Assist patient with eating if necessary   - Allow adequate time for meals  - Recommend/ encourage appropriate diets, oral nutritional supplements, and vitamin/mineral supplements  - Order, calculate, and assess calorie counts as needed  - Recommend, monitor, and adjust tube feedings and TPN/PPN based on assessed needs  - Assess need for intravenous fluids  - Provide specific nutrition/hydration education as appropriate  - Include patient/family/caregiver in decisions related to nutrition  Outcome: Progressing    Pt reports good po intake and appetite  No significant weight changes  Nutrition therapy appropriate  Low nutrition risk at this time  Will monitor

## 2017-10-09 NOTE — PLAN OF CARE
Problem: DISCHARGE PLANNING - CARE MANAGEMENT  Goal: Discharge to post-acute care or home with appropriate resources  INTERVENTIONS:  - Conduct assessment to determine patient/family and health care team treatment goals, and need for post-acute services based on payer coverage, community resources, and patient preferences, and barriers to discharge  - Address psychosocial, clinical, and financial barriers to discharge as identified in assessment in conjunction with the patient/family and health care team  - Arrange appropriate level of post-acute services according to patient's   needs and preference and payer coverage in collaboration with the physician and health care team  - Communicate with and update the patient/family, physician, and health care team regarding progress on the discharge plan  - Arrange appropriate transportation to post-acute venues   Outcome: Completed Date Met: 10/09/17  CM received prescription for eliquis  CM provided Pt with coupon for 30 days free as well as $10 co-pay card

## 2017-10-10 LAB
CARDIOLIPIN IGA SER IA-ACNC: <9 APL U/ML (ref 0–11)
CARDIOLIPIN IGG SER IA-ACNC: <9 GPL U/ML (ref 0–14)
CARDIOLIPIN IGM SER IA-ACNC: <9 MPL U/ML (ref 0–12)

## 2017-10-10 NOTE — PROGRESS NOTES
Assessment  1  Cephalgia (784 0) (R51)   2  Transient neurologic deficit (781 99) (R29 818)    Discussion/Summary  Discussion Summary:   Proceed to [facility] emergency room  by private vehicle  Spouse to drive  center notified  Understands and agrees with treatment plan: The treatment plan was reviewed with the patient/guardian  The patient/guardian understands and agrees with the treatment plan   Counseling Documentation With Imm: The patient, patient's family was counseled regarding instructions for management  Chief Complaint  1  Hand Problem  Chief Complaint Free Text Note Form: Yesterday, pt was hanging wash and suddenly felt tingling in her left hand and hand was weak for a few minutes  At the same time, she felt left side of face droop which also, shortly resolved  Has headache  History of Present Illness  HPI: 61-year-old female that comes in for further evaluation after having a weird event yesterday  States she was hanging clothes on the line and all of a sudden her left arm stopped working and felt tingly and numb  She said she could not control the clothespin  This only lasted for a couple minutes  Then she felt some drooping in her face that only lasted about a minute  She had a residual headache occipital/crown of head the remainder of the day  She says that she woke up okay today but now has a low-grade headache returning just does not feel quite like herself  Hospital Based Practices Required Assessment:   Pain Assessment   the patient states they have pain  (on a scale of 0 to 10, the patient rates the pain at 2 )   Abuse And Domestic Violence Screen    Yes, the patient is safe at home -The patient states no one is hurting them  Depression And Suicide Screen  No, the patient has not had thoughts of hurting themself  No, the patient has not felt depressed in the past 7 days        Review of Systems  Focused-Female:   Constitutional: feeling tired, but-No fever, no chills, feels well, no tiredness, no recent weight gain or loss  ENT: no ear ache, no loss of hearing, no nosebleeds or nasal discharge, no sore throat or hoarseness  Cardiovascular: no complaints of slow or fast heart rate, no chest pain, no palpitations, no leg claudication or lower extremity edema  Respiratory: no complaints of shortness of breath, no wheezing, no dyspnea on exertion, no orthopnea or PND  Musculoskeletal: no complaints of arthralgia, no myalgia, no joint swelling or stiffness, no limb pain or swelling  Integumentary: no complaints of skin rash or lesion, no itching or dry skin, no skin wounds  Neurological: as noted in HPI  Active Problems  1  Contact dermatitis due to plant (692 6) (L25 5)    Past Medical History  Active Problems And Past Medical History Reviewed: The active problems and past medical history were reviewed and updated today  Family History  Family History Reviewed: The family history was reviewed and updated today  Social History  Social History Reviewed: The social history was reviewed and updated today  Surgical History  Surgical History Reviewed: The surgical history was reviewed and updated today  Current Meds   1  No Reported Medications Recorded  Medication List Reviewed: The medication list was reviewed and updated today  Allergies  1  No Known Drug Allergies    Vitals  Signs   Recorded: [id number] 01:16PM   Temperature: 98 5 F, Temporal  Heart Rate: 60  Respiration: 18  Systolic: 165  Diastolic: 80  Height: 5 ft 4 in  Weight: 150 lb   BMI Calculated: 25 75  BSA Calculated: 1 73  O2 Saturation: 100  Pain Scale: 2    Physical Exam    Constitutional Well-developed well-nourished female in no acute distress  Pulmonary   Respiratory effort: No increased work of breathing or signs of respiratory distress  Auscultation of lungs: Clear to auscultation  Cardiovascular   Palpation of heart: Normal PMI, no thrills      Auscultation of heart: Normal rate and rhythm, normal S1 and S2, without murmurs  Examination of extremities for edema and/or varicosities: Normal     Lymphatic   Palpation of lymph nodes in neck: No lymphadenopathy  Neurologic   Cranial nerves: Cranial nerves 2-12 intact  Reflexes: 2+ and symmetric  Sensation: No sensory loss  Psychiatric   Orientation to person, place, and time: Normal     Mood and affect: Normal        Signatures   Electronically signed by :  Diana Petersen [facility]; Oct  7 2017  1:38PM EST                       (Author)    Electronically signed by : Jc Garcias DO; Oct  9 2017  8:41AM EST                       (Co-author)

## 2017-10-11 LAB
APTT SCREEN TO CONFIRM RATIO: 1.07 RATIO (ref 0–1.4)
B2 GLYCOPROT1 IGA SER-ACNC: <9 GPI IGA UNITS (ref 0–25)
B2 GLYCOPROT1 IGG SER-ACNC: <9 GPI IGG UNITS (ref 0–20)
B2 GLYCOPROT1 IGM SER-ACNC: <9 GPI IGM UNITS (ref 0–32)
CONFIRM APTT/NORMAL: 42.4 SEC (ref 0–55)
LA PPP-IMP: NORMAL
PROT S ACT/NOR PPP: 71 % (ref 63–140)
PROT S ACT/NOR PPP: 86 % (ref 57–157)
PROT S PPP-ACNC: 103 % (ref 60–150)
SCREEN APTT: 36.2 SEC (ref 0–51.9)
SCREEN DRVVT: 34.3 SEC (ref 0–47)
THROMBIN TIME: 21.8 SEC (ref 0–23)

## 2017-10-12 LAB
COMMENT: ABNORMAL
F5 GENE MUT ANL BLD/T: ABNORMAL

## 2017-10-13 LAB
F2 GENE MUT ANL BLD/T: NORMAL
Lab: NORMAL

## 2017-11-10 ENCOUNTER — ALLSCRIPTS OFFICE VISIT (OUTPATIENT)
Dept: OTHER | Facility: OTHER | Age: 57
End: 2017-11-10

## 2017-11-11 NOTE — PROGRESS NOTES
Assessment    1  H/O ischemic right MCA stroke (V12 54) (E32 96)  2  Embolic stroke (972 78) (Z35 1)    Plan  Embolic stroke    · Start: Pravastatin Sodium 10 MG Oral Tablet; TAKE 1 TABLET AT BEDTIME  Rx By: Billy Pham; Dispense: 30 Days ; #:30 Tablet; Refill: 3;For: Embolic stroke; ITZEL = N; Faxed To: [address] [place] [address] [phone]   · *1 -  [facility] (CARDIOLOGY ) Co-Management  * need for JAKE to lookfor a PFO because patient has factor V leiden deficiency  Status: Active  Requested for:[id number]  Ordered; For: Embolic stroke;  Ordered By: Billy Pham  Performed:   Due: 41OVH0548  () Care Summary provided  : Yes   · Follow-up visit in 2 months Evaluation and Treatment  Follow-up  Status: Complete Done: [id number]  Ordered; For: Embolic stroke;  Ordered By: Billy Pham  Performed:   Due: 01MET3158; Last Updated By: Libbie Schirmer; 11/10/2017 12:14:42 PM  Unlinked    · Stop: Atorvastatin Calcium 40 MG Oral Tablet  Dispense: 0 Days ; #:30 Tablet; Refill: 1; ITZEL = N; Record; Last Updated By: Billy Pham; 11/10/2017 11:59:59 AM    Discussion/Summary  This is a 61year old  female with history of Factor V Leiden which is heterozygous mutation who presented to the clinic with a recent hospital discharge for Right MCA stroke, etiology of her stroke is still unclear, she will need further workup to see if there is any evidence of cardioembolic stroke  Her echocardiogram which was done was not a bubble study so unfortunately i am not sure if she had a PFO  I want to obtain JAKE to see if there is any evidence of PFO, and to look for atrial thrombus  Factor V leiden deficiency causes venous clots and less likely to cause arterial clots, unless there is a PFO, which can cause a stroke     -Continue Eliquis for now until cause of the stroke is determined -Recommend cardiology follow up for JAKE, and possible loop if JAKE is negative for PFO -since she is heterozygous Factor V leiden, she will likely only need aspirin lifelong - but will defer to hematology-oncology who she is seeing December 12th  -patient cannot tolerate Atorvastatin due to fatigue, and sleepiness, will switch to Pravastatin 10mg PO qHS which has better side effect profile, and since her lipids are normal, she does not need a high potency statin   -follow up with me in 2 months  Advised the patient/guardian if they have any stroke like symptoms such as facial droop, weakness on either side, speech trouble, numbness, balance issues, any vision changes, or any new headache, to call 9-1-1 immediately or to proceed to the nearest ER immediately  Patient/guardian was counseled regarding instructions for management  Possible side effects of new medications were reviewed with the patient/guardian today  Treatment plan was reviewed with the patient/guardian  The patient/guardian understands and agrees with the plan  Counseling Documentation With Imm: The  patient1  was counseled regarding1  diagnostic results1 ,-- instructions for management1 ,-- risk factor reductions1 ,-- prognosis1 ,-- risks and benefits of treatment options1         1 Amended By: Vanda Buckner; Nov 10 2017 12:17 PM EST    Chief Complaint  Chief Complaint Free Text Note Form: Patient present for hospital follow-up appointment regarding stroke      History of Present Illness  HPI:    This is a 61year old  female who does not have any vascular risk factors who presented  today for that hospital discharge follow-up for right MCA stroke  She recovered well from her stroke  She did not have any lasting deficits  She does not have any new stroke-like symptoms  She has a history of factor 5 Leiden deficiency cannot to be heterozygous  Was thought to be the cause of that stroke in the hospital and she was started on Eliquis 5 milligrams b i d     Currently on thought Eliquis 5 milligrams b i d  and has not had any bruising or bleeding concerns    She had a CT head and neck which did not reveal any atherosclerotic changes  Echocardiogram was done but was in a bubble study so whether not she has a PFO was not determined  She is on atorvastatin 40 milligrams as well and she states when she takes the medication 20 minutes later at night on she feels needs to sleep  She states that this is consistently happening every single night  She does Feel more fatigued and tired since the stroke  He is compliant with her medications  Review of Systems  Neurological ROS:  Constitutional: no fever, no chills, no recent weight gain, no recent weight loss, no complaints of feeling tired, no changes in appetite  HEENT:  no sinus problems, not feeling congested, no blurred vision, no dryness of the eyes, no eye pain, no hearing loss, no tinnitus, no mouth sores, no sore throat, no hoarseness, no dysphagia, no masses, no bleeding  Cardiovascular:  no chest pain or pressure, no palpitations present, the heart rate was not rapid or irregular, no swelling in the arms or legs, no poor circulation  Respiratory:  no unusual or persistant cough, no shortness of breath with or without exertion  Gastrointestinal:  no nausea, no vomiting, no diarrhea, no abdominal pain, no changes in bowel habits, no melena, no loss of bowel control  Genitourinary:  no incontinence, no feelings of urinary urgency, no increase in frequency, no urinary hesitancy, no dysuria, no hematuria  Musculoskeletal:  no arthralgias, no myalgias, no immobility or loss of function, no head/neck/back pain, no pain while walking  Integumentary  no masses, no rash, no skin lesions, no livedo reticularis  Psychiatric:  no anxiety, no depression, no mood swings, no psychiatric hospitalizations, no sleep problems  Endocrine   no unusual weight loss or gain, no excessive urination, no excessive thirst, no hair loss or gain, no hot or cold intolerance, no menstrual period change or irregularity, no loss of sexual ability or drive, no erection difficulty, no nipple discharge  Hematologic/Lymphatic:  no unusual bleeding, no tendency for easy bruising, no clotting skin or lumps  Neurological General:  no headache, no nausea or vomiting, no lightheadedness, no convulsions, no blackouts, no syncope, no trauma, no photopsia, no increased sleepiness, no trouble falling asleep, no snoring, no awakening at night  Neurological Mental Status:  no confusion, no mood swings, no alteration or loss of consciousness, no difficulty expressing/understanding speech, no memory problems  Neurological Cranial Nerves:  no blurry or double vision, no loss of vision, no face drooping, no facial numbness or weakness, no taste or smell loss/changes, no hearing loss or ringing, no vertigo or dizziness, no dysphagia, no slurred speech  Neurological Motor findings include:  no tremor, no twitching, no cramping(pre/post exercise), no atrophy  Neurological Coordination:  no unsteadiness, no vertigo or dizziness, no clumsiness, no problems reaching for objects  Neurological Sensory:  no numbness, no pain, no tingling, does not fall when eyes closed or taking a shower  Neurological Gait:  no difficulty walking, not falling to one side, no sensation of being pushed, has not had falls  ROS Reviewed:   ROS reviewed  Active Problems  1  Cephalgia (784 0) (R51)  2  Contact dermatitis due to plant (692 6) (L25 5)  3  Transient neurologic deficit (781 99) (R29 818)    Past Medical History  1  History of stroke (V12 54) (Z86 73)  Active Problems And Past Medical History Reviewed: The active problems and past medical history were reviewed and updated today  Family History  Father   1  Family history of   2  Family history of lymphoma (V16 7) (Z80 2)  Family History Reviewed: The family history was reviewed and updated today         Social History     · Caffeine use (V49 89) (F15 90)   · Former smoker (F46 46) (G38 990)   ·    · No alcohol use   · No illicit drug use   · Six children  Social History Reviewed: The social history was reviewed and updated today  The social history was reviewed and is unchanged  Current Meds  1  Atorvastatin Calcium 40 MG Oral Tablet; Take 1 tablet daily; Therapy: (Recorded:10Nov2017) to Recorded  2  Eliquis 5 MG Oral Tablet; take 1 tablet bid; Therapy: (Recorded:10Nov2017) to Recorded  Medication List Reviewed: The medication list was reviewed and updated today  Allergies    1  No Known Drug Allergies    Vitals  Signs   Recorded: 94HOS8828 11:21AM   Heart Rate: 66  Respiration: 16  Systolic: 099  Diastolic: 78  Height: 5 ft 4 in  Weight: 149 lb 1 oz  BMI Calculated: 25 59  BSA Calculated: 1 73  O2 Saturation: 97    Physical Exam  General: Patient is not in any acute distress  Mental status: Alert, awake oriented x 3 and follows commands Neurologic Examination:  Speech: Speech is fluent Cranial Nerves: Pupils equal round reactive to light and extraocular movements intact  Visual fields full to confrontation  Facial sensation intact to soft touch in V1, V2 and V3 distributions  Face symmetric  Shoulder shrug strong  Motor: Strength 5/5 in all 4 extremities  No drift  Normal rapid alternating movements  Normal tone Sensory: Sensation intact to soft touch in all 4 extremities  Reflexes: 2+ in all 4 extremities, downgoing toes bilaterally Gait: Normal gait, tandem walking, normal arm to swing  Results/Data  Diagnostic Studies Reviewed:  CT Scan Review CTA HEAD 10/8/17significant carotid or vertebral artery stenosis  focal intracranial stenosis or aneurysm  infarct right frontal deep white matter  thyroid gland containing numerous small nodules  MRI Review MRI Brain 10/8/17or subacute right frontal infarction measuring 2 5 x 1 3 cm  Diagnostic Review Lipid Panel 10/8/[id number] 5 8        Signatures   Electronically signed by : Paz Gross MD; Nov 10 2017 12:16PM EST (Author)    Electronically signed by : Henri Hurtado MD; Nov 10 2017 12:18PM EST                       (Author)

## 2017-11-16 ENCOUNTER — ALLSCRIPTS OFFICE VISIT (OUTPATIENT)
Dept: OTHER | Facility: OTHER | Age: 57
End: 2017-11-16

## 2017-11-16 DIAGNOSIS — Z86.73 PERSONAL HISTORY OF TRANSIENT ISCHEMIC ATTACK (TIA), AND CEREBRAL INFARCTION WITHOUT RESIDUAL DEFICITS: ICD-10-CM

## 2017-11-18 NOTE — PROGRESS NOTES
Assessment  Assessed   1  H/O ischemic right MCA stroke (V12 54) (Z86 73)    Plan  H/O ischemic right MCA stroke    · 3D JAKE; Status:Active; Requested for:16Nov2017;   Perform:[facility]; Due:16Nov2018; Ordered; For:H/O ischemic right MCA stroke; Ordered By:Davion Alanis;   · Follow-up visit in 1 year Evaluation and Treatment  Follow-up  Status: Complete  Done:16Nov2017  Ordered; For: H/O ischemic right MCA stroke;  Ordered By: Lizzy Talamantes  Performed:   Due: 14OGR8972; Last Updated By: Nereyda Goodwin; 11/16/2017 4:14:10 PM  Health Maintenance    · EKG/ECG- POC; Status:Complete;   Done: 11JLS7069  Perform: In Office; Due:16Nov2018; Last Updated By:Devi Lee; 11/16/2017 3:28:54 PM;Ordered; For:Health Maintenance; Ordered By:[place] Alan[place];    Discussion/Summary  Cardiology Discussion Summary Free Text Note Form  [facility]:   1  Cryptogenic CVA/embolic stroke of on determine source, no structural abnormalities on surface 2D echocardiogram or evidence of arrhythmia such as atrial fibrillation  Heterozygous factor 5 Leiden mutation  Lengthy discussion with Ms Alexandr Leary regarding her embolic stroke of undetermined source/cryptogenic CVA  We discussed role of transesophageal echocardiogram in this instance which at her age and benefit of this would be to assess for evidence of atrial septal defect such as PFO  Statistically, in this instance she has a slightly higher risk than the general population of having a PFO which is around 25%  has had no evidence of atrial fibrillation and arguably at her age in no prior other cardiac risk factors such as diabetes, prior heart failure transesophageal echocardiogram for left-sided abnormalities is not very high yield  a transesophageal echocardiogram will be performed after her visit with Hematology    reason for this is because if she is felt to be require lifelong anticoagulation subjecting her to a JAKE to find a PFO is not entirely necessary as the treatment for this will still be full anticoagulation  However, if it is felt that given the fact that she is heterozygous that lifelong anticoagulation is not necessarily needed then pursuing transesophageal echocardiogram to identify a PFO will be performed  Discussion for percutaneous closure for secondary stroke prevention versus medical therapy (antiplatelets versus full anticoagulation), can be discussed  We discussed percutaneous closure in detail with the assumption she has a PFO and its benefits over medical therapy  Presumptively her RoPE score would be 6 with a statistical chance that a PFO as part of embolic circuit would be 62%  will follow up with her after her visit with Hematology to discuss any further workup  Chief Complaint  Chief Complaint Free Text Note Form: Patient presents in consult requested by Neurology  Prince to be scheduled for possible PFO  History of Present Illness  Cardiology HPI Free Text Note Form [facility]: Patient presents for consultation regarding recent stroke in possible need for transesophageal echocardiogram for cardiac source of embolism  hypercoagulable panel showed that she is heterozygous for Factor 5 be taken  She has a family history of factor 5 mutation with a sister with venous thromboembolism  is planning on obtaining a hematology evaluation in [place] where her sister has been before   echocardiogram revealed normal left ventricular systolic function with no significant valvar disease and no structural heart abnormalities that would predispose her to cardiac source of embolism  A transesophageal echocardiogram was not performed to assess for atrial septal defect such as PFO  has had no atrial fibrillation  Nor was any found the time of her presentation  states she was on an airplane ride to [place] from the [facility] around the time of her CVA   She had an MRI obtain when she presented with her symptoms which showed at acute and subacute right frontal infarction  result she is here today  Review of Systems  Cardiology Female ROS:    Cardiac: No complaints of chest pain, no palpitations, no fainting  Skin: No complaints of nonhealing sores or skin rash  Genitourinary: No complaints of recurrent urinary tract infections, frequent urination at night, difficult urination, blood in urine, kidney stones, loss of bladder control, kidney problems, denies any birth control or hormone replacement, is not post menopausal, not currently pregnant  Psychological: No complaints of feeling depressed, anxiety, panic attacks, or difficulty concentrating  General: No complaints of trouble sleeping, lack of energy, fatigue, appetite changes, weight changes, fever, frequent infections, or night sweats  Respiratory: No complaints of shortness of breath, cough with sputum, or wheezing  HEENT: No complaints of serious problems, hearing problems, nose problems, throat problems, or snoring  Gastrointestinal: No complaints of liver problems, nausea, vomiting, heartburn, constipation, bloody stools, diarrhea, problems swallowing, adbominal pain, or rectal bleeding  Hematologic: No complaints of bleeding disorders, anemia, blood clots, or excessive brusing  Neurological: No complaints of numbness, tingling, dizziness, weakness, seizures, headaches, syncope or fainting, AM fatigue, daytime sleepiness, no witnessed apnea episodes  Musculoskeletal: No complaints of arthritis, back pain, or painfull swelling  Active Problems  Problems   1  Cephalgia (784 0) (R51)  2  Contact dermatitis due to plant (692 6) (R88 3)  3  Embolic stroke (532 26) (I63 9)  4  H/O ischemic right MCA stroke (V12 54) (Z86 73)  5  Transient neurologic deficit (781 99) (R29 818)    Past Medical History  Problems   1  History of stroke (V12 54) (Z86 73)  Active Problems And Past Medical History Reviewed: The active problems and past medical history were reviewed and updated today  Surgical History  Surgical History Reviewed: The surgical history was reviewed and updated today  Family History  Father   1  Family history of   2  Family history of lymphoma (V16 7) (Z80 2)  Family History Reviewed: The family history was reviewed and updated today  Social History  Problems    · Caffeine use (V49 89) (F15 90)   · Former smoker (M79 23) (L86 582)   ·    · No alcohol use   · No illicit drug use   · Six children  Social History Reviewed: The social history was reviewed and updated today  Current Meds  1  Eliquis 5 MG Oral Tablet; take 1 tablet bid; Therapy: (Recorded:2017) to Recorded  2  Pravastatin Sodium 10 MG Oral Tablet; TAKE 1 TABLET AT BEDTIME; Therapy: 58NZN6544 to (Evaluate:2018); Last Rx:2017 Ordered  Medication List Reviewed: The medication list was reviewed and updated today  Allergies  Medication   1  No Known Drug Allergies    Vitals  Vital Signs    Recorded: 17MBJ8955 03:27PM   Heart Rate 59   Systolic 055, LUE, Sitting   Diastolic 78, LUE, Sitting   Height 5 ft 4 in   Weight 150 lb 2 oz   BMI Calculated 25 77   BSA Calculated 1 73   O2 Saturation 98       Physical Exam   Constitutional  General appearance: No acute distress, well appearing and well nourished  Eyes  Conjunctiva and Sclera examination: Conjunctiva pink, sclera anicteric  Ears, Nose, Mouth, and Throat - Oropharynx: Clear, nares are clear, mucous membranes are moist   Neck  Neck and thyroid: Normal, supple, trachea midline, no thyromegaly  Pulmonary  Respiratory effort: No increased work of breathing or signs of respiratory distress  Auscultation of lungs: Clear to auscultation, no rales, no rhonchi, no wheezing, good air movement  Cardiovascular  Auscultation of heart: Normal rate and rhythm, normal S1 and S2, no murmurs  Carotid pulses: Normal, 2+ bilaterally  Peripheral vascular exam: Normal pulses throughout, no tenderness, erythema or swelling  Pedal pulses: Normal, 2+ bilaterally  Examination of extremities for edema and/or varicosities: Normal    Abdomen  Abdomen: Non-tender and no distention  Liver and spleen: No hepatomegaly or splenomegaly  Musculoskeletal Gait and station: Normal gait  -- Digits and nails: Normal without clubbing or cyanosis  -- Inspection/palpation of joints, bones, and muscles: Normal, ROM normal    Skin - Skin and subcutaneous tissue: Normal without rashes or lesions  Skin is warm and well perfused, normal turgor  Neurologic - Cranial nerves: II - XII intact  -- Speech: Normal    Psychiatric - Orientation to person, place, and time: Normal -- Mood and affect: Normal       Future Appointments    Date/Time Provider Specialty Site   01/12/2018 10:30 AM Marina Andino MD Neurology [place] [facility]       Signatures   Electronically signed by : Holli Dukes DO; Nov 17 2017  7:25AM EST                       (Author)    Electronically signed by : Holli Dukes DO; Nov 17 2017  7:26AM EST                       (Author)

## 2017-12-21 ENCOUNTER — HOSPITAL ENCOUNTER (OUTPATIENT)
Dept: NON INVASIVE DIAGNOSTICS | Facility: HOSPITAL | Age: 57
Discharge: HOME | End: 2017-12-21
Attending: INTERNAL MEDICINE
Payer: COMMERCIAL

## 2017-12-21 ENCOUNTER — GENERIC CONVERSION - ENCOUNTER (OUTPATIENT)
Dept: OTHER | Facility: OTHER | Age: 57
End: 2017-12-21

## 2017-12-21 ENCOUNTER — ANESTHESIA (OUTPATIENT)
Dept: NON INVASIVE DIAGNOSTICS | Facility: HOSPITAL | Age: 57
End: 2017-12-21

## 2017-12-21 ENCOUNTER — ANESTHESIA EVENT (OUTPATIENT)
Dept: NON INVASIVE DIAGNOSTICS | Facility: HOSPITAL | Age: 57
End: 2017-12-21

## 2017-12-21 VITALS
HEART RATE: 57 BPM | SYSTOLIC BLOOD PRESSURE: 120 MMHG | DIASTOLIC BLOOD PRESSURE: 70 MMHG | OXYGEN SATURATION: 99 % | RESPIRATION RATE: 18 BRPM

## 2017-12-21 DIAGNOSIS — Z86.73 PERSONAL HISTORY OF TRANSIENT ISCHEMIC ATTACK (TIA), AND CEREBRAL INFARCTION WITHOUT RESIDUAL DEFICITS: ICD-10-CM

## 2017-12-21 PROCEDURE — 93312 ECHO TRANSESOPHAGEAL: CPT

## 2017-12-21 RX ORDER — PROPOFOL 10 MG/ML
INJECTION, EMULSION INTRAVENOUS CONTINUOUS PRN
Status: DISCONTINUED | OUTPATIENT
Start: 2017-12-21 | End: 2017-12-21 | Stop reason: SURG

## 2017-12-21 RX ORDER — PROPOFOL 10 MG/ML
INJECTION, EMULSION INTRAVENOUS AS NEEDED
Status: DISCONTINUED | OUTPATIENT
Start: 2017-12-21 | End: 2017-12-21 | Stop reason: SURG

## 2017-12-21 RX ORDER — SODIUM CHLORIDE 9 MG/ML
INJECTION, SOLUTION INTRAVENOUS CONTINUOUS PRN
Status: DISCONTINUED | OUTPATIENT
Start: 2017-12-21 | End: 2017-12-21 | Stop reason: SURG

## 2017-12-21 RX ADMIN — TOPICAL ANESTHETIC 1 SPRAY: 200 SPRAY DENTAL; PERIODONTAL at 08:51

## 2017-12-21 RX ADMIN — TOPICAL ANESTHETIC 1 SPRAY: 200 SPRAY DENTAL; PERIODONTAL at 08:50

## 2017-12-21 RX ADMIN — PROPOFOL 80 MG: 10 INJECTION, EMULSION INTRAVENOUS at 08:53

## 2017-12-21 RX ADMIN — PROPOFOL 100 MCG/KG/MIN: 10 INJECTION, EMULSION INTRAVENOUS at 08:53

## 2017-12-21 RX ADMIN — SODIUM CHLORIDE: 0.9 INJECTION, SOLUTION INTRAVENOUS at 08:50

## 2017-12-21 NOTE — ANESTHESIA PREPROCEDURE EVALUATION
Review of Systems/Medical History  Patient summary reviewed  Chart reviewed      Cardiovascular    Comment: SUMMARY     LEFT VENTRICLE:  Size was normal   Systolic function was normal  Ejection fraction was estimated to be 60 %  Wall thickness was normal   Doppler parameters were consistent with abnormal left ventricular relaxation (grade 1 diastolic dysfunction)  Factor V lieden     RIGHT VENTRICLE:  The size was normal   Systolic function was normal      TRICUSPID VALVE:  There was trace regurgitation      HISTORY: PRIOR HISTORY: Symptoms of CVA     PROCEDURE: The procedure was performed at the bedside  This was a routine study  The transthoracic approach was used  The study included complete 2D imaging, M-mode, complete spectral Doppler, and color Doppler  The heart rate was 57 bpm,  at the start of the study  Images were obtained from the parasternal, apical, subcostal, and suprasternal notch acoustic windows  Image quality was adequate      LEFT VENTRICLE: Size was normal  Systolic function was normal  Ejection fraction was estimated to be 60 %  There were no regional wall motion abnormalities  Wall thickness was normal  DOPPLER: Doppler parameters were consistent with  abnormal left ventricular relaxation (grade 1 diastolic dysfunction)      RIGHT VENTRICLE: The size was normal  Systolic function was normal  Wall thickness was normal      LEFT ATRIUM: Size was normal      RIGHT ATRIUM: Size was normal      MITRAL VALVE: Valve structure was normal  There was normal leaflet separation  DOPPLER: The transmitral velocity was within the normal range  There was no evidence for stenosis  There was no regurgitation      AORTIC VALVE: The valve was trileaflet  Leaflets exhibited normal thickness and normal cuspal separation  DOPPLER: Transaortic velocity was within the normal range  There was no evidence for stenosis   There was no regurgitation      TRICUSPID VALVE: The valve structure was normal  There was normal leaflet separation  DOPPLER: The transtricuspid velocity was within the normal range  There was no evidence for stenosis  There was trace regurgitation  The tricuspid jet  envelope definition was inadequate for estimation of RV systolic pressure  There are no indirect findings suggestive of moderate or severe pulmonary hypertension      PULMONIC VALVE: Leaflets exhibited normal thickness, no calcification, and normal cuspal separation  DOPPLER: The transpulmonic velocity was within the normal range  There was no regurgitation      PERICARDIUM: There was no pericardial effusion  The pericardium was normal in appearance      AORTA: The root exhibited normal size      SYSTEMIC VEINS: IVC: The inferior vena cava was normal in size and course  Respirophasic changes were normal      SYSTEM MEASUREMENT TABLES,  Pulmonary  Negative pulmonary ROS ,        GI/Hepatic  Negative GI/hepatic ROS          Negative  ROS        Endo/Other  Negative endo/other ROS      GYN  Negative gynecology ROS          Hematology  Negative hematology ROS      Musculoskeletal  Negative musculoskeletal ROS        Neurology    CVA , no residual symptoms,    Psychology   Negative psychology ROS            Physical Exam    Airway    Mallampati score: II         Dental       Cardiovascular  Rhythm: regular, Rate: normal,     Pulmonary  Breath sounds clear to auscultation,     Other Findings        Anesthesia Plan  ASA Score- 3     Anesthesia Type- IV sedation with anesthesia with ASA Monitors  Additional Monitors:   Airway Plan:         Plan Factors-  Patient did not smoke on day of surgery  Induction- intravenous  Postoperative Plan-     Informed Consent- Anesthetic plan and risks discussed with patient  I personally reviewed this patient with the CRNA  Discussed and agreed on the Anesthesia Plan with the CRNA  Pj Cheek

## 2017-12-21 NOTE — ANESTHESIA POSTPROCEDURE EVALUATION
Post-Op Assessment Note      CV Status:  Stable    Mental Status:  Alert and awake    Hydration Status:  Euvolemic    PONV Controlled:  Controlled    Airway Patency:  Patent    Post Op Vitals Reviewed: Yes          Staff: CRNA, Anesthesiologist           BP   109/54   Temp      Pulse 55   Resp   14   SpO2   100

## 2017-12-21 NOTE — DISCHARGE INSTRUCTIONS
Transesophageal Echocardiogram   WHAT YOU NEED TO KNOW:   A transesophageal echocardiogram (JAKE) is a procedure used to check for problems with your heart  It will also show any problems in the blood vessels near your heart  Sound waves are sent to the heart through a tube inserted into your throat  The sound waves show the structure and function of your heart through pictures on a monitor  DISCHARGE INSTRUCTIONS:   Rest:  Rest until you are fully awake  You may be sleepy for a while after your procedure  Do not eat or drink until you are able to swallow normally:  Start with soft foods, such as oatmeal, yogurt, or applesauce  Once you can eat soft foods easily, you may begin to eat solid foods  Follow up with your healthcare provider as directed:  Write down your questions so you remember to ask them during your visits  Contact your healthcare provider if:   · You have a fever or chills  · You taste blood in your mouth  · You have a severe sore throat or difficulty swallowing  · You have questions or concerns about your condition or care  Seek care immediately or call 911 if:   · You have any of the following signs of a heart attack:      ¨ Squeezing, pressure, or pain in your chest that lasts longer than 5 minutes or returns    ¨ Discomfort or pain in your back, neck, jaw, stomach, or arm     ¨ Trouble breathing    ¨ Nausea or vomiting    ¨ Lightheadedness or a sudden cold sweat, especially with chest pain or trouble breathing    © 2017 [address] [place] [address] Street is for End User's use only and may not be sold, redistributed or otherwise used for commercial purposes  All illustrations and images included in CareNotes® are the copyrighted property of A D A M [employer]  or Kevin Smith  The above information is an  only  It is not intended as medical advice for individual conditions or treatments   Talk to your doctor, nurse or pharmacist before following any medical regimen to see if it is safe and effective for you  Moderate Sedation   WHAT YOU NEED TO KNOW:   Moderate sedation, or conscious sedation, is medicine used during procedures to help you feel relaxed and calm  You will be awake and able to follow directions without anxiety or pain  You will remember little to none of the procedure  You may feel tired, weak, or unsteady on your feet after you get sedation  You may also have trouble concentrating or short-term memory loss  These symptoms should go away in 24 hours or less  DISCHARGE INSTRUCTIONS:   Call 911 or have someone else call for any of the following:   · You have sudden trouble breathing  · You cannot be woken  Seek care immediately if:   · You have a severe headache or dizziness  · Your heart is beating faster than usual   Contact your healthcare provider if:   · You have a fever  · You have nausea or are vomiting for more than 8 hours after the procedure  · Your skin is itchy, swollen, or you have a rash  · You have questions or concerns about your condition or care  Self-care:   · Have someone stay with you for 24 hours  This person can drive you to errands and help you do things around the house  This person can also watch for problems  · Rest and do quiet activities for 24 hours  Do not exercise, ride a bike, or play sports  Stand up slowly to prevent dizziness and falls  Take short walks around the house with another person  Slowly return to your usual activities the next day  · Do not drive or use dangerous machines or tools for 24 hours  You may injure yourself or others  Examples include a lawnmower, saw, or drill  Do not return to work for 24 hours if you use dangerous machines or tools for work  · Do not make important decisions for 24 hours  For example, do not sign important papers or invest money  · Drink liquids as directed  Liquids help flush the sedation medicine out of your body   Ask how much liquid to drink each day and which liquids are best for you  · Eat small, frequent meals to prevent nausea and vomiting  Start with clear liquids such as juice or broth  If you do not vomit after clear liquids, you can eat your usual foods  · Do not drink alcohol or take medicines that make you drowsy  This includes medicines that help you sleep and anxiety medicines  Ask your healthcare provider if it is safe for you to take pain medicine  Follow up with your healthcare provider as directed:  Write down your questions so you remember to ask them during your visits  © 2017 [address] Information is for End User's use only and may not be sold, redistributed or otherwise used for commercial purposes  All illustrations and images included in CareNotes® are the copyrighted property of [employer]  or Kevin Smith  The above information is an  only  It is not intended as medical advice for individual conditions or treatments  Talk to your doctor, nurse or pharmacist before following any medical regimen to see if it is safe and effective for you

## 2018-01-12 VITALS
HEART RATE: 66 BPM | RESPIRATION RATE: 16 BRPM | DIASTOLIC BLOOD PRESSURE: 78 MMHG | OXYGEN SATURATION: 97 % | BODY MASS INDEX: 25.45 KG/M2 | HEIGHT: 64 IN | SYSTOLIC BLOOD PRESSURE: 118 MMHG | WEIGHT: 149.06 LBS

## 2018-01-15 VITALS
WEIGHT: 150.13 LBS | HEART RATE: 59 BPM | OXYGEN SATURATION: 98 % | HEIGHT: 64 IN | DIASTOLIC BLOOD PRESSURE: 78 MMHG | SYSTOLIC BLOOD PRESSURE: 104 MMHG | BODY MASS INDEX: 25.63 KG/M2

## 2018-01-22 ENCOUNTER — TRANSCRIBE ORDERS (OUTPATIENT)
Dept: ADMINISTRATIVE | Facility: HOSPITAL | Age: 58
End: 2018-01-22

## 2018-01-22 DIAGNOSIS — E04.1 NONTOXIC UNINODULAR GOITER: Primary | ICD-10-CM

## 2018-01-24 ENCOUNTER — HOSPITAL ENCOUNTER (OUTPATIENT)
Dept: RADIOLOGY | Age: 58
Discharge: HOME | End: 2018-01-24
Payer: COMMERCIAL

## 2018-01-24 DIAGNOSIS — E04.1 NONTOXIC UNINODULAR GOITER: ICD-10-CM

## 2018-01-24 PROCEDURE — 76536 US EXAM OF HEAD AND NECK: CPT

## 2018-02-08 ENCOUNTER — OFFICE VISIT (OUTPATIENT)
Dept: NEUROLOGY | Facility: CLINIC | Age: 58
End: 2018-02-08
Payer: COMMERCIAL

## 2018-02-08 VITALS
SYSTOLIC BLOOD PRESSURE: 120 MMHG | HEIGHT: 65 IN | WEIGHT: 150.6 LBS | DIASTOLIC BLOOD PRESSURE: 68 MMHG | RESPIRATION RATE: 18 BRPM | BODY MASS INDEX: 25.09 KG/M2 | HEART RATE: 60 BPM

## 2018-02-08 DIAGNOSIS — I63.411 CEREBROVASCULAR ACCIDENT (CVA) DUE TO EMBOLISM OF RIGHT MIDDLE CEREBRAL ARTERY (HCC): Primary | ICD-10-CM

## 2018-02-08 DIAGNOSIS — R09.89 SYMPTOMS OF CEREBROVASCULAR ACCIDENT (CVA): ICD-10-CM

## 2018-02-08 PROCEDURE — 99214 OFFICE O/P EST MOD 30 MIN: CPT | Performed by: PSYCHIATRY & NEUROLOGY

## 2018-02-08 NOTE — PROGRESS NOTES
Patient is here today for a follow up for her stroke    Patient ID: Tenzin Enrique is a 61 y o  female  Assessment/Plan: This is a 61year old  female who Is here as follow up for Right MCA stroke, etiology cryptogenic but likely silent Atrial fibrillation (given the dilated Atrial chambers on JAKE)  Patient on Eliquis but will determine the duration of the treatment based on Loop recorder placement  No stroke symptoms  I personally reviewed images of her stroke from the hospital   She has heterozygous factor V leiden mutation and she was found to have no PFO on JAKE, and given the atrial dilated chambers  PLAN:  -for now will continue eliquis but will recommend seeing cardiology for loop recorder placement  -follow up with me in 6 months  Problem List Items Addressed This Visit     Symptoms of cerebrovascular accident (CVA)    Embolic stroke ([facility] [address] ) - Primary             Subjective:    HPI    This is a 61year old Female here for a follow up for right MCA stroke, embolic stroke  She saw Dr Teresa Mohr who recommended against JAKE  She saw hematology who is Factor V leiden specialist, she did a JAKE which did not show any evidence of PFO, but does show bi-atrial dilation  She is heterogenous factor V leiden positive  She is on Eliquis for stroke prevention  She is not sure about taking Eliquis lifelong  I recommended that she see cardiology for loop recorder placement, and she would like to know more answers  The following portions of the patient's history were reviewed and updated as appropriate:   She  has no past medical history on file  She  does not have any pertinent problems on file  She  has no past surgical history on file  Her family history is not on file  She  reports that she has never smoked  She has never used smokeless tobacco  She reports that she does not drink alcohol or use drugs    Current Outpatient Prescriptions   Medication Sig Dispense Refill    apixaban (ELIQUIS) 5 mg Take 1 tablet by mouth 2 (two) times a day  0    atorvastatin (LIPITOR) 40 mg tablet Take 1 tablet by mouth every evening 30 tablet 0     No current facility-administered medications for this visit  Current Outpatient Prescriptions on File Prior to Visit   Medication Sig    apixaban (ELIQUIS) 5 mg Take 1 tablet by mouth 2 (two) times a day    atorvastatin (LIPITOR) 40 mg tablet Take 1 tablet by mouth every evening     No current facility-administered medications on file prior to visit  She has No Known Allergies            Objective:    Blood pressure 120/68, pulse 60, resp  rate 18, height 5' 5" (1 651 m), weight 68 3 kg (150 lb 9 6 oz)  Physical Exam  Skin - no lesions  General - alert awake  Chest - non-labored breathing  Gait - normal   Neurological Exam      ROS:    Review of Systems   Constitutional: Positive for appetite change  HENT: Negative  Eyes: Negative  Respiratory: Negative  Cardiovascular: Negative  Gastrointestinal: Negative  Endocrine: Negative  Genitourinary: Negative  Musculoskeletal: Negative  Skin: Negative  Allergic/Immunologic: Negative  Neurological: Negative  Hematological: Negative  Psychiatric/Behavioral: The patient is nervous/anxious

## 2018-06-20 DIAGNOSIS — I48.91 ATRIAL FIBRILLATION, UNSPECIFIED TYPE (HCC): Primary | ICD-10-CM

## 2018-07-02 RX ORDER — APIXABAN 5 MG/1
TABLET, FILM COATED ORAL
Qty: 60 TABLET | Refills: 11 | Status: SHIPPED | OUTPATIENT
Start: 2018-07-02 | End: 2018-12-06 | Stop reason: SDUPTHER

## 2018-12-06 ENCOUNTER — OFFICE VISIT (OUTPATIENT)
Dept: CARDIOLOGY CLINIC | Facility: CLINIC | Age: 58
End: 2018-12-06
Payer: COMMERCIAL

## 2018-12-06 VITALS
HEART RATE: 50 BPM | HEIGHT: 65 IN | DIASTOLIC BLOOD PRESSURE: 60 MMHG | SYSTOLIC BLOOD PRESSURE: 122 MMHG | BODY MASS INDEX: 23.82 KG/M2 | WEIGHT: 143 LBS

## 2018-12-06 DIAGNOSIS — R06.00 DYSPNEA ON EXERTION: ICD-10-CM

## 2018-12-06 DIAGNOSIS — Z86.73 H/O ISCHEMIC RIGHT MCA STROKE: Primary | ICD-10-CM

## 2018-12-06 PROCEDURE — 99214 OFFICE O/P EST MOD 30 MIN: CPT | Performed by: INTERNAL MEDICINE

## 2018-12-06 PROCEDURE — 93000 ELECTROCARDIOGRAM COMPLETE: CPT | Performed by: INTERNAL MEDICINE

## 2018-12-06 RX ORDER — PLANT STANOL ESTER 450 MG
TABLET ORAL
COMMUNITY

## 2018-12-06 NOTE — PROGRESS NOTES
Cardiology Follow Up Visit    Marybel Andrade  1960  [id number]  [address]    1  H/O ischemic right MCA stroke  POCT ECG       Interval History:    Patient presents follow-up embolic MCA stroke, cryptogenic  Transesophageal echocardiogram without evidence of right-to-left shunt, no PFO  Not found to have a hypercoagulable state  She has been on anticoagulation with Eliquis since  No no neurological events  She does play tennis  Intermittent dyspnea on occasion  States partner sometimes notice that she short of breath  Otherwise,  Feels well  Patient Active Problem List   Diagnosis    Symptoms of cerebrovascular accident (CVA)    Embolic stroke ([facility] [address] )     Past Medical History:   Diagnosis Date    Cephalgia     History of ischemic right MCA stroke     Transient neurologic deficit      Social History     Social History    Marital status: /Civil Union     Spouse name: N/A    Number of children: N/A    Years of education: N/A     Occupational History    Not on file  Social History Main Topics    Smoking status: Former Smoker     Types: Cigarettes     Quit date: 1981    Smokeless tobacco: Never Used    Alcohol use No    Drug use: No    Sexual activity: Not on file     Other Topics Concern    Not on file     Social History Narrative    No narrative on file      Family History   Problem Relation Age of Onset    Lymphoma Father      No past surgical history on file      Current Outpatient Prescriptions:     apixaban (ELIQUIS) 5 mg, Take 1 tablet (5 mg total) by mouth 2 (two) times a day, Disp: 180 tablet, Rfl: 3    Cod Liver Oil 1250-130 units CAPS, Take by mouth, Disp: , Rfl:     atorvastatin (LIPITOR) 40 mg tablet, Take 1 tablet by mouth every evening (Patient not taking: Reported on 12/6/2018 ), Disp: 30 tablet, Rfl: 0  No Known Allergies      Social, Family, Medication history reviewed and updated as necessary      Labs:     Lab Results   Component Value Date    K 3 7 10/07/2017     10/07/2017    CO2 28 10/07/2017    BUN 14 10/07/2017    CREATININE 0 78 10/07/2017    CALCIUM 9 0 10/07/2017       Lab Results   Component Value Date    WBC 9 20 10/07/2017    HGB 13 5 10/07/2017    HCT 41 2 10/07/2017     10/09/2017     10/07/2017     10/07/2017       No results found for: CHOL  Lab Results   Component Value Date    HDL 71 (H) 10/08/2017     Lab Results   Component Value Date    LDLCALC 105 (H) 10/08/2017     No results found for: LDLDIRECT          Lab Results   Component Value Date    TRIG 83 10/08/2017       Lab Results   Component Value Date    ALT 32 10/07/2017    AST 26 10/07/2017       Lab Results   Component Value Date    INR 0 86 10/07/2017       No results found for: NTBNP    Lab Results   Component Value Date    HGBA1C 5 8 10/08/2017           Imaging: Reviewed in epic        Review of Systems:  14 systems reviewed and negative with exception of the above         PHYSICAL EXAM:      Vitals:    12/06/18 1603   BP: 122/60   Pulse: (!) 50     Body mass index is 23 8 kg/m²  Weight (last 2 days)     Date/Time   Weight    12/06/18 1603  64 9 (143)              Gen: No acute distress  HEENT: anicteric, mucous membranes moist  Neck: supple, no jugular venous distention, or carotid bruit  Heart: regular, normal s1 and s2, no murmur/rub or gallop  Lungs :clear to auscultation bilaterally, no rales/rhonchi or wheeze  Abdomen: soft nontender, normoactive bowel sounds, no organomegaly  Ext: warm and perfused, normal femoral pulses, no edema, or clubbing  Skin: warm, no rashes  Neuro: AAO x 3, no focal findings  Psychiatric: normal affect  Musculoskeletal: no obvious joint deformities   Discussion/Summary:    1  History of right MCA stroke, presumptive embolic, cryptogenic, no evidence of PFO or hypercoagulable state, on anticoagulation  2   Intermittent dyspnea on exertion    Plan:  Patient continues to do well  No recent or recurrent neurological events since index MCA CVA  Remains on full anticoagulation for secondary prevention    Vague dyspnea, will obtain stress echocardiogram

## 2018-12-19 ENCOUNTER — HOSPITAL ENCOUNTER (OUTPATIENT)
Dept: NON INVASIVE DIAGNOSTICS | Facility: CLINIC | Age: 58
Discharge: HOME | End: 2018-12-19
Payer: COMMERCIAL

## 2018-12-19 DIAGNOSIS — R06.00 DYSPNEA ON EXERTION: ICD-10-CM

## 2018-12-19 PROCEDURE — 93351 STRESS TTE COMPLETE: CPT | Performed by: INTERNAL MEDICINE

## 2018-12-19 PROCEDURE — 93350 STRESS TTE ONLY: CPT

## 2018-12-20 LAB
ARRHY DURING EX: NORMAL
CHEST PAIN STATEMENT: NORMAL
MAX DIASTOLIC BP: 80 MMHG
MAX HEART RATE: 150 BPM
MAX PREDICTED HEART RATE: 161 BPM
MAX. SYSTOLIC BP: 140 MMHG
PROTOCOL NAME: NORMAL
REASON FOR TERMINATION: NORMAL
TARGET HR FORMULA: NORMAL
TEST INDICATION: NORMAL
TIME IN EXERCISE PHASE: NORMAL

## 2019-02-04 NOTE — PROGRESS NOTES
Assessment/Plan:    Varicose veins of bilateral lower extremities with other complications  Varicose veins with pain, heaviness, aching, tiredness, burning/itching, swelling; R>L  -64yo female with PMH of embolic MCA CVA 22/1215 of unknown etiology; heterozygous Factor V Leiden def; on Eliquis    -patient has some spider and varicose veins to B/L LEs with symptoms  -she exercises regularly, maintains a healthy weight, elevates her legs throughout the day as able  -she is also interested in possible sclerotherapy for her spiders    We discussed the pathophysiology of varicose veins and venous insufficiency  Patient expressed understanding  Will start with conservative measures to aid in symptom relief and progression of disease  PLAN:  -compression stockings 20-30mmHg Rx given to be worn during waking hours  -elevate legs throughout the day as able  -exercise daily as tolerated  -continue low-fat low-chol diet  -LEVDR in 3 months  -return for f/u with Vascular Surgeon after testing to discuss treatment plan      Embolic stroke ([address] )  Embolic CVA to R MCA of unknown etiology  -Negative for DVT at time of CVA; ECHO with biatrial dilatation, no PFO or thrombus; Factor V Leiden heterozygous; no Carotid dx noted  -On Xarelto therapy per Cards to continue indefinitely given FH and CVA hx  -continue f/u with Cards and Neuro       Diagnoses and all orders for this visit:    Varicose veins of bilateral lower extremities with other complications  -     Compression Stocking  -     VAS reflux lower limb venous duplex study with reflux assessment, complete bilateral; Future    Cerebrovascular accident (CVA) due to embolism of right middle cerebral artery ([address] )          Subjective:      Patient ID: Jaziel Runner is a 60 y o  female  Pt is here for painful veins in her right lower extremity  Pt has some pain and swelling in her right lower extremity  Pt did have a CVA 10/7/17    Pt does not wear compression stocking but does elevate her legs when possible  Pt has not had any testing at this time  Pt currently taking Eliquis  Marcia Maya is a 64yo female with PMH of embolic CVA 84/7731, factor V Leiden heterozygous, and Eliquis therapy  She is presenting to our iffice today for her varicose and spider veins  Patient reports heaviness, tiredness, aching to both legs R>L  She has itching and burning on occasion, and reports some cramps at night to the right leg  She has some swelling bilaterally to both legs/feet  She denies any wounds or ulcerations  She has had 6 pregnancies, has a FH of varicose veins  She denies any DVT/PE  She is a non-smoker  She is active and exercises frequently  The following portions of the patient's history were reviewed and updated as appropriate: allergies, current medications, past family history, past medical history, past social history, past surgical history and problem list     Review of Systems   Constitutional: Negative  HENT: Negative  Eyes: Negative  Respiratory: Negative  Negative for shortness of breath  Cardiovascular: Positive for leg swelling  Negative for chest pain  Gastrointestinal: Negative  Endocrine: Negative  Genitourinary: Negative  Musculoskeletal: Negative  Skin: Negative  Allergic/Immunologic: Negative  Neurological: Negative  Hematological: Negative  Psychiatric/Behavioral: Negative  ROS reviewed and updated  Objective:      /82 (BP Location: Right arm, Patient Position: Sitting, Cuff Size: Adult)   Pulse 64   Temp 97 8 °F (36 6 °C) (Tympanic)   Resp 18   Ht 5' 5" (1 651 m)   Wt 63 5 kg (140 lb)   BMI 23 30 kg/m²          Physical Exam   Constitutional: She is oriented to person, place, and time  She appears well-developed and well-nourished  HENT:   Head: Normocephalic and atraumatic  Eyes: Pupils are equal, round, and reactive to light  EOM are normal  No scleral icterus     Neck: Normal range of motion  Cardiovascular: Normal rate, regular rhythm, normal heart sounds and intact distal pulses  No murmur heard  Pulmonary/Chest: Effort normal and breath sounds normal  No respiratory distress  Abdominal: Soft  Bowel sounds are normal    Musculoskeletal: Normal range of motion  Neurological: She is alert and oriented to person, place, and time  She has normal strength  Skin: Skin is warm, dry and intact  Spider and reticular veins to B/L legs; trace edema B/L   Psychiatric: She has a normal mood and affect  Her speech is normal and behavior is normal  Judgment and thought content normal  Cognition and memory are normal    Nursing note and vitals reviewed

## 2019-02-05 ENCOUNTER — CONSULT (OUTPATIENT)
Dept: VASCULAR SURGERY | Facility: CLINIC | Age: 59
End: 2019-02-05
Payer: COMMERCIAL

## 2019-02-05 VITALS
SYSTOLIC BLOOD PRESSURE: 128 MMHG | WEIGHT: 140 LBS | BODY MASS INDEX: 23.32 KG/M2 | HEART RATE: 64 BPM | DIASTOLIC BLOOD PRESSURE: 82 MMHG | RESPIRATION RATE: 18 BRPM | TEMPERATURE: 97.8 F | HEIGHT: 65 IN

## 2019-02-05 DIAGNOSIS — I83.893 VARICOSE VEINS OF BILATERAL LOWER EXTREMITIES WITH OTHER COMPLICATIONS: Primary | ICD-10-CM

## 2019-02-05 DIAGNOSIS — I63.411 CEREBROVASCULAR ACCIDENT (CVA) DUE TO EMBOLISM OF RIGHT MIDDLE CEREBRAL ARTERY (HCC): ICD-10-CM

## 2019-02-05 PROCEDURE — 99203 OFFICE O/P NEW LOW 30 MIN: CPT | Performed by: NURSE PRACTITIONER

## 2019-02-05 NOTE — ASSESSMENT & PLAN NOTE
Varicose veins with pain, heaviness, aching, tiredness, burning/itching, swelling; R>L  -64yo female with PMH of embolic MCA CVA 75/5247 of unknown etiology; heterozygous Factor V Leiden def; on Eliquis    -patient has some spider and varicose veins to B/L LEs with symptoms  -she exercises regularly, maintains a healthy weight, elevates her legs throughout the day as able  -she is also interested in possible sclerotherapy for her spiders    We discussed the pathophysiology of varicose veins and venous insufficiency  Patient expressed understanding  Will start with conservative measures to aid in symptom relief and progression of disease      PLAN:  -compression stockings 20-30mmHg Rx given to be worn during waking hours  -elevate legs throughout the day as able  -exercise daily as tolerated  -continue low-fat low-chol diet  -LEVDR in 3 months  -return for f/u with Vascular Surgeon after testing to discuss treatment plan

## 2019-02-05 NOTE — PATIENT INSTRUCTIONS
We discussed the pathophysiology of varicose veins and venous insufficiency  Will start with conservative measures to aid in symptom relief and progression of disease  PLAN:  -compression stockings 20-30mmHg Rx given to be worn during waking hours  -elevate legs throughout the day as able  -exercise daily as tolerated  -LEVDR in 3 months  -return for f/u with Vascular Surgeon after testing to discuss treatment plan        Varicose Veins   AMBULATORY CARE:   Varicose veins  are veins that become large, twisted, and swollen  They are common on the back of the calves, knees, and thighs  Varicose veins are caused by valves in your veins that do not work properly  This causes blood to collect and increase pressure in the veins of your legs  The increased pressure causes your veins to stretch, get larger, swell, and twist        Common symptoms include the following: Your symptoms may be worse after you stand or sit for long periods of time  You may have any of the following:  · Blue, purple, or bulging veins in your legs     · Pain, swelling, or muscle cramps in your legs    · Feeling of fatigue or heaviness in your legs    · Cramping in your legs  Seek care immediately if:   · You have a wound that does not heal or is infected  · You have an injury that has broken your skin and caused your varicose veins to bleed  · Your leg is swollen and hard  · You notice that your legs or feet are turning blue or black  · Your leg feels warm, tender, and painful  It may look swollen and red  Contact your healthcare provider if:   · You have pain in your leg that does not go away or gets worse  · You notice sudden large bruising on your legs  · You have a rash on your leg  · Your symptoms keep you from doing your daily activities  · You have questions or concerns about your condition or care  Treatment of varicose veins  aims to decrease symptoms, improve appearance, and prevent further problems  Treatment will depend on which veins are affected and how severe your condition is  You may need procedures to treat or remove your varicose veins  For example, your healthcare provider may inject a solution or use a laser to close the varicose veins  Surgery to remove long veins may also be done  Ask your healthcare provider for more information about procedures used to treat varicose veins  Manage varicose veins:   · Do not sit or stand for long periods of time  This can cause the blood to collect in your legs and make your symptoms worse  Bend or rotate your ankles several times every hour  Walk around for a few minutes every hour to get blood moving in your legs  · Do not cross your legs when you sit  This decreases blood flow to your feet and can make your symptoms worse  · Do not wear tight clothing or shoes  Do not wear high-heeled shoes  Do not wear clothes that are tight around the waist or knees  · Maintain a healthy weight  Being overweight or obese can make your varicose veins worse  Ask your healthcare provider how much you should weigh  Ask him or her to help you create a weight loss plan if you are overweight  · Wear pressure stockings as directed  The stockings are tight and put pressure on your legs  They improve blood flow and help prevent clots  · Elevate your legs  Keep them above the level of your heart for 15 to 30 minutes several times a day  You can also prop the end of your bed up slightly to elevate your legs while you sleep  This will help blood to flow back to your heart  · Get regular exercise  Talk to your healthcare provider about the best exercise plan for you  Exercise can improve blood flow to your legs and feet  Follow up with your healthcare provider as directed:  Write down your questions so you remember to ask them during your visits     © 2017 [address] Information is for End User's use only and may not be sold, redistributed or otherwise used for commercial purposes  All illustrations and images included in CareNotes® are the copyrighted property of A D A M [employer]  or [employer]  The above information is an  only  It is not intended as medical advice for individual conditions or treatments  Talk to your doctor, nurse or pharmacist before following any medical regimen to see if it is safe and effective for you

## 2019-02-05 NOTE — ASSESSMENT & PLAN NOTE
Embolic CVA to R MCA of unknown etiology  -Negative for DVT at time of CVA; ECHO with biatrial dilatation, no PFO or thrombus;  Factor V Leiden heterozygous; no Carotid dx noted  -On Xarelto therapy per Cards to continue indefinitely given FH and CVA hx  -continue f/u with Cards and Neuro

## 2019-07-10 DIAGNOSIS — I48.91 ATRIAL FIBRILLATION, UNSPECIFIED TYPE (HCC): ICD-10-CM

## 2019-10-08 DIAGNOSIS — I48.91 ATRIAL FIBRILLATION, UNSPECIFIED TYPE (HCC): ICD-10-CM

## 2019-12-06 ENCOUNTER — OFFICE VISIT (OUTPATIENT)
Dept: CARDIOLOGY CLINIC | Facility: CLINIC | Age: 59
End: 2019-12-06
Payer: COMMERCIAL

## 2019-12-06 VITALS
HEART RATE: 64 BPM | DIASTOLIC BLOOD PRESSURE: 70 MMHG | HEIGHT: 65 IN | BODY MASS INDEX: 24.49 KG/M2 | WEIGHT: 147 LBS | SYSTOLIC BLOOD PRESSURE: 116 MMHG

## 2019-12-06 DIAGNOSIS — Z86.73 H/O ISCHEMIC RIGHT MCA STROKE: Primary | ICD-10-CM

## 2019-12-06 PROCEDURE — 99213 OFFICE O/P EST LOW 20 MIN: CPT | Performed by: INTERNAL MEDICINE

## 2019-12-06 NOTE — PROGRESS NOTES
Cardiology Follow Up Visit    Jessica Burris  1960  [id number]  [address] [facility]    1  H/O ischemic right MCA stroke  Lipid Panel with Direct LDL reflex    Comprehensive metabolic panel         Discussion/Summary:    1  History of MCA CVA, no PFO or evidence of atrial fibrillation, hypercoagulable heterozygous factor 5, remains on Eliquis      Plan:  Patient continues to do well  She is asymptomatic overall  Continue Eliquis, annual follow-up    Interval History:    Patient presents follow-up history of embolic 6 MCA he stroke, cryptogenic    Transesophageal echocardiogram without evidence of right-to-left shunt  No hypercoagulable state  She has been maintained on Eliquis since  Saw Hematology did not feel she requires lifelong anticoagulation but she has kept that since she has tolerated well  No recurrent neurological events  Had has intermittent dyspnea, stress testing December 2018 11 0 5 minutes Deepak protocol no evidence of ischemia by stress echo    Feels well, plays tennis      Patient Active Problem List   Diagnosis    Symptoms of cerebrovascular accident (CVA)    Embolic stroke ([address] )    Varicose veins of bilateral lower extremities with other complications     Past Medical History:   Diagnosis Date    Cephalgia     History of ischemic right MCA stroke     Transient neurologic deficit      Social History     Socioeconomic History    Marital status: /Civil Union     Spouse name: Not on file    Number of children: Not on file    Years of education: Not on file    Highest education level: Not on file   Occupational History    Not on file   Social Needs    Financial resource strain: Not on file    Food insecurity:     Worry: Not on file     Inability: Not on file    Transportation needs:     Medical: Not on file     Non-medical: Not on file   Tobacco Use    Smoking status: Former Smoker Types: Cigarettes     Last attempt to quit: 1981     Years since quittin 9    Smokeless tobacco: Never Used   Substance and Sexual Activity    Alcohol use: No    Drug use: No    Sexual activity: Not on file   Lifestyle    Physical activity:     Days per week: Not on file     Minutes per session: Not on file    Stress: Not on file   Relationships    Social connections:     Talks on phone: Not on file     Gets together: Not on file     Attends Denominational service: Not on file     Active member of club or organization: Not on file     Attends meetings of clubs or organizations: Not on file     Relationship status: Not on file    Intimate partner violence:     Fear of current or ex partner: Not on file     Emotionally abused: Not on file     Physically abused: Not on file     Forced sexual activity: Not on file   Other Topics Concern    Not on file   Social History Narrative    Not on file      Family History   Problem Relation Age of Onset    Lymphoma Father      No past surgical history on file      Current Outpatient Medications:     apixaban (ELIQUIS) 5 mg, Take 1 tablet (5 mg total) by mouth 2 (two) times a day, Disp: 180 tablet, Rfl: 1    Cod Liver Oil 1250-130 units CAPS, Take by mouth, Disp: , Rfl:   No Known Allergies      Social, Family, Medication history reviewed and updated as necessary      Labs:     Lab Results   Component Value Date    K 3 7 10/07/2017     10/07/2017    CO2 28 10/07/2017    BUN 14 10/07/2017    CREATININE 0 78 10/07/2017    CALCIUM 9 0 10/07/2017       Lab Results   Component Value Date    WBC 9 20 10/07/2017    HGB 13 5 10/07/2017    HCT 41 2 10/07/2017     10/09/2017     10/07/2017     10/07/2017       No results found for: CHOL  Lab Results   Component Value Date    HDL 71 (H) 10/08/2017     Lab Results   Component Value Date    LDLCALC 105 (H) 10/08/2017     No results found for: LDLDIRECT          Lab Results   Component Value Date    TRIG 83 10/08/2017       Lab Results   Component Value Date    ALT 32 10/07/2017    AST 26 10/07/2017       Lab Results   Component Value Date    INR 0 86 10/07/2017       No results found for: NTBNP    Lab Results   Component Value Date    HGBA1C 5 8 10/08/2017           Imaging: Reviewed in epic        Review of Systems:  14 systems reviewed and negative with exception of the above        PHYSICAL EXAM:      Vitals:    12/06/19 1331   BP: 116/70   Pulse: 64     Body mass index is 24 46 kg/m²  Weight (last 2 days)     Date/Time   Weight    12/06/19 1331   66 7 (147)                Gen: No acute distress  HEENT: anicteric, mucous membranes moist  Neck: supple, no jugular venous distention, or carotid bruit  Heart: regular, normal s1 and s2, no murmur/rub or gallop  Lungs :clear to auscultation bilaterally, no rales/rhonchi or wheeze  Abdomen: soft nontender, normoactive bowel sounds, no organomegaly  Ext: warm and perfused, normal femoral pulses, no edema, or clubbing  Skin: warm, no rashes  Neuro: AAO x 3, no focal findings  Psychiatric: normal affect  Musculoskeletal: no obvious joint deformities

## 2020-04-22 DIAGNOSIS — I48.91 ATRIAL FIBRILLATION, UNSPECIFIED TYPE (HCC): ICD-10-CM

## 2020-12-07 ENCOUNTER — OFFICE VISIT (OUTPATIENT)
Dept: CARDIOLOGY CLINIC | Facility: CLINIC | Age: 60
End: 2020-12-07
Payer: COMMERCIAL

## 2020-12-07 VITALS
TEMPERATURE: 97.1 F | HEART RATE: 52 BPM | WEIGHT: 152 LBS | DIASTOLIC BLOOD PRESSURE: 70 MMHG | SYSTOLIC BLOOD PRESSURE: 122 MMHG | HEIGHT: 65 IN | BODY MASS INDEX: 25.33 KG/M2

## 2020-12-07 DIAGNOSIS — Z86.73 HISTORY OF ISCHEMIC MIDDLE CEREBRAL ARTERY STROKE: Primary | ICD-10-CM

## 2020-12-07 PROCEDURE — 99213 OFFICE O/P EST LOW 20 MIN: CPT | Performed by: INTERNAL MEDICINE

## 2020-12-07 PROCEDURE — 93000 ELECTROCARDIOGRAM COMPLETE: CPT | Performed by: INTERNAL MEDICINE

## 2020-12-07 RX ORDER — ZINC 25 MG
TABLET ORAL
COMMUNITY

## 2021-01-15 ENCOUNTER — HOSPITAL ENCOUNTER (OUTPATIENT)
Dept: NON INVASIVE DIAGNOSTICS | Facility: CLINIC | Age: 61
Discharge: HOME | End: 2021-01-15
Payer: COMMERCIAL

## 2021-01-15 DIAGNOSIS — Z86.73 HISTORY OF ISCHEMIC MIDDLE CEREBRAL ARTERY STROKE: ICD-10-CM

## 2021-01-15 PROCEDURE — 93306 TTE W/DOPPLER COMPLETE: CPT

## 2021-01-18 PROCEDURE — 93306 TTE W/DOPPLER COMPLETE: CPT | Performed by: INTERNAL MEDICINE

## 2021-01-19 ENCOUNTER — TELEPHONE (OUTPATIENT)
Dept: CARDIOLOGY CLINIC | Facility: CLINIC | Age: 61
End: 2021-01-19

## 2021-01-19 NOTE — TELEPHONE ENCOUNTER
Silvester Hodgkins, DO  [facility]             Please let patient know echo looked fine        I called & left a vmm with results

## 2022-06-02 ENCOUNTER — APPOINTMENT (OUTPATIENT)
Dept: RADIOLOGY | Age: 62
End: 2022-06-02
Payer: COMMERCIAL

## 2022-06-02 ENCOUNTER — TELEPHONE (OUTPATIENT)
Dept: OBGYN CLINIC | Facility: HOSPITAL | Age: 62
End: 2022-06-02

## 2022-06-02 ENCOUNTER — OFFICE VISIT (OUTPATIENT)
Dept: URGENT CARE | Age: 62
End: 2022-06-02
Payer: COMMERCIAL

## 2022-06-02 ENCOUNTER — HOSPITAL ENCOUNTER (OUTPATIENT)
Dept: RADIOLOGY | Age: 62
Discharge: HOME | End: 2022-06-02

## 2022-06-02 VITALS
OXYGEN SATURATION: 98 % | DIASTOLIC BLOOD PRESSURE: 74 MMHG | HEART RATE: 54 BPM | RESPIRATION RATE: 18 BRPM | SYSTOLIC BLOOD PRESSURE: 133 MMHG | TEMPERATURE: 97.1 F

## 2022-06-02 DIAGNOSIS — S99.911A INJURY OF RIGHT ANKLE, INITIAL ENCOUNTER: ICD-10-CM

## 2022-06-02 DIAGNOSIS — M25.531 PAIN IN BOTH WRISTS: ICD-10-CM

## 2022-06-02 DIAGNOSIS — S82.51XA AVULSION FRACTURE OF MEDIAL MALLEOLUS OF RIGHT TIBIA, CLOSED, INITIAL ENCOUNTER: ICD-10-CM

## 2022-06-02 DIAGNOSIS — M25.532 PAIN IN BOTH WRISTS: ICD-10-CM
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
## 2022-06-02 DIAGNOSIS — S52.355A CLOSED NONDISPLACED COMMINUTED FRACTURE OF SHAFT OF LEFT RADIUS, INITIAL ENCOUNTER: Primary | ICD-10-CM

## 2022-06-02 PROCEDURE — 73110 X-RAY EXAM OF WRIST: CPT

## 2022-06-02 PROCEDURE — 73610 X-RAY EXAM OF ANKLE: CPT

## 2022-06-02 PROCEDURE — 99213 OFFICE O/P EST LOW 20 MIN: CPT

## 2022-06-02 NOTE — TELEPHONE ENCOUNTER
Hello,  Please advise if the following patient can be forced onto the schedule:    Patient: Denita Adames    : 1960    MRN: [id number]    Call back #: [phone]    Insurance: [facility]     Reason for appointment: Distal left radial intra-articular mildly dorsally angulated fracture and small chip or avulsion fracture of the ulnar styloid process  Requested doctor/location: [place] [facility] [place]       Thank you

## 2022-06-02 NOTE — PATIENT INSTRUCTIONS
A small avulsion fracture was appreciated on a your medial malleolus  A closed, nondisplaced fracture was appreciated to your left radius  Please keep splint intact and use walking boot until you see Ortho  A referral was made to Orthopedic surgery  You may alternate Tylenol and ibuprofen as needed for pain  Please apply ice to affected areas

## 2022-06-02 NOTE — PROGRESS NOTES
[facility]        NAME: Bella Damico is a 60 y o  female  : 1960    MRN: [id number]  DATE: 2022  TIME: 1:32 PM    Assessment and Plan   Fall, initial encounter [W19  XXXA]  1  Fall, initial encounter  XR wrist 3+ vw right    XR wrist 3+ vw left   2  Pain in both wrists  XR wrist 3+ vw right    XR wrist 3+ vw left     X-ray of left wrist reviewed, there is a nondisplaced fracture appreciated to the radius  X-ray of right ankle reviewed, there is a small avulsion appreciated to the medial malleolus  There is no acute osseous abnormality appreciated the right wrist   These x-rays were reviewed and confirmed with the radiologist on site  Patient Instructions       Follow up with PCP in 3-5 days  Proceed to  ER if symptoms worsen  Chief Complaint     Chief Complaint   Patient presents with    Wrist Injury     Left Wrist pain     Fall         History of Present Illness       Patient presenting for evaluation of bilateral wrist injury  Patient states that she was playing tennis today, and tripped and fell with her hands out reached  She states that she believes that she hip both hands at the time of injury  She still mainly is complaining of pain in her left wrist   She denies any numbness or tingling at this time  She denies any previous left upper extremity injuries  Patient denies hitting her head or losing consciousness at time of injury  Patient also complains of right-sided ankle pain  Patient states that she rolled her ankle at tennis prior to falling on at reached arms  She denies any numbness or tingling to her right ankle, she denies any previous right ankle injuries  Patient states she took ibuprofen for her symptoms, and is providing moderate relief  Review of Systems   Review of Systems   Constitutional: Negative for chills and fever  HENT: Negative for ear pain and sore throat  Eyes: Negative for pain and visual disturbance     Respiratory: Negative for cough and shortness of breath  Cardiovascular: Negative for chest pain and palpitations  Gastrointestinal: Negative for abdominal pain and vomiting  Genitourinary: Negative for dysuria and hematuria  Musculoskeletal: Positive for arthralgias (Left and right wrist, right ankle)  Negative for back pain  Skin: Negative for color change and rash  Neurological: Negative for seizures and syncope  All other systems reviewed and are negative  Current Medications       Current Outpatient Medications:     apixaban (ELIQUIS) 5 mg, Take 1 tablet (5 mg total) by mouth 2 (two) times a day, Disp: 180 tablet, Rfl: 3    Cod Liver Oil 1250-130 units CAPS, Take by mouth, Disp: , Rfl:     Elderberry 575 MG/5ML SYRP, Take by mouth, Disp: , Rfl:     Zinc 25 MG TABS, Take by mouth, Disp: , Rfl:     Current Allergies     Allergies as of 06/02/2022    (No Known Allergies)            The following portions of the patient's history were reviewed and updated as appropriate: allergies, current medications, past family history, past medical history, past social history, past surgical history and problem list      Past Medical History:   Diagnosis Date    Cephalgia     History of ischemic right MCA stroke     Transient neurologic deficit        History reviewed  No pertinent surgical history  Family History   Problem Relation Age of Onset    Lymphoma Father          Medications have been verified  Objective   /74 (BP Location: Right arm, Patient Position: Sitting, Cuff Size: Standard)   Pulse (!) 54   Temp (!) 97 1 °F (36 2 °C) (Temporal)   Resp 18   SpO2 98%        Physical Exam     Physical Exam  Vitals and nursing note reviewed  Constitutional:       General: She is not in acute distress  Appearance: Normal appearance  She is normal weight  She is not ill-appearing  HENT:      Head: Normocephalic and atraumatic        Right Ear: Tympanic membrane normal       Left Ear: Tympanic membrane normal       Nose: Nose normal  No congestion or rhinorrhea  Mouth/Throat:      Mouth: Mucous membranes are moist       Pharynx: Oropharynx is clear  No oropharyngeal exudate or posterior oropharyngeal erythema  Eyes:      Extraocular Movements: Extraocular movements intact  Conjunctiva/sclera: Conjunctivae normal       Pupils: Pupils are equal, round, and reactive to light  Cardiovascular:      Rate and Rhythm: Normal rate and regular rhythm  Pulses: Normal pulses  Heart sounds: Normal heart sounds  No murmur heard  No friction rub  No gallop  Pulmonary:      Effort: Pulmonary effort is normal  No respiratory distress  Breath sounds: Normal breath sounds  No stridor  No wheezing, rhonchi or rales  Chest:      Chest wall: No tenderness  Abdominal:      General: Abdomen is flat  Bowel sounds are normal       Palpations: Abdomen is soft  Tenderness: There is no abdominal tenderness  There is no guarding or rebound  Musculoskeletal:      Right wrist: Tenderness (Radial aspect of wrist) present  No swelling, deformity, effusion, lacerations, bony tenderness, snuff box tenderness or crepitus  Normal range of motion  Normal pulse  Left wrist: Tenderness (Radial aspect of wrist) present  No swelling, deformity, effusion, lacerations, bony tenderness, snuff box tenderness or crepitus  Decreased range of motion  Normal pulse  Cervical back: Normal range of motion and neck supple  Right ankle: No swelling, deformity, ecchymosis or lacerations  Tenderness present  Normal range of motion  Anterior drawer test negative  Normal pulse  Legs:    Skin:     General: Skin is warm and dry  Capillary Refill: Capillary refill takes less than 2 seconds  Neurological:      General: No focal deficit present  Mental Status: She is alert and oriented to person, place, and time     Psychiatric:         Mood and Affect: Mood normal          Behavior: Behavior normal

## 2022-06-03 ENCOUNTER — OFFICE VISIT (OUTPATIENT)
Dept: OBGYN CLINIC | Facility: HOSPITAL | Age: 62
End: 2022-06-03
Payer: COMMERCIAL

## 2022-06-03 ENCOUNTER — TELEPHONE (OUTPATIENT)
Dept: OBGYN CLINIC | Facility: HOSPITAL | Age: 62
End: 2022-06-03

## 2022-06-03 VITALS
BODY MASS INDEX: 25.33 KG/M2 | HEIGHT: 65 IN | DIASTOLIC BLOOD PRESSURE: 80 MMHG | SYSTOLIC BLOOD PRESSURE: 130 MMHG | WEIGHT: 152 LBS | HEART RATE: 49 BPM

## 2022-06-03 DIAGNOSIS — S63.501A SPRAIN OF RIGHT WRIST, INITIAL ENCOUNTER: ICD-10-CM

## 2022-06-03 DIAGNOSIS — S52.355A CLOSED NONDISPLACED COMMINUTED FRACTURE OF SHAFT OF LEFT RADIUS, INITIAL ENCOUNTER: ICD-10-CM

## 2022-06-03 DIAGNOSIS — S52.532A CLOSED COLLES' FRACTURE OF LEFT RADIUS, INITIAL ENCOUNTER: Primary | ICD-10-CM

## 2022-06-03 DIAGNOSIS — S82.51XA AVULSION FRACTURE OF MEDIAL MALLEOLUS OF RIGHT TIBIA, CLOSED, INITIAL ENCOUNTER: ICD-10-CM

## 2022-06-03 PROCEDURE — 99204 OFFICE O/P NEW MOD 45 MIN: CPT | Performed by: ORTHOPAEDIC SURGERY

## 2022-06-03 PROCEDURE — 29125 APPL SHORT ARM SPLINT STATIC: CPT | Performed by: ORTHOPAEDIC SURGERY

## 2022-06-03 NOTE — TELEPHONE ENCOUNTER
RTE  [facility] insurance is showing up inactive but as per patient it should be active  Phone call to [facility] @ [phone] to verify  Insurance  As per phone conversation with [facility] the insurance is active until 6/30/22  Call reference number is [id number]

## 2022-06-03 NOTE — PROGRESS NOTES
Assessment   Diagnoses and all orders for this visit:    Closed Colles' fracture of left radius, initial encounter  -     Ambulatory Referral to Physical Therapy; Future  -     Durable Medical Equipment    Sprain of right wrist, initial encounter  -     Ambulatory Referral to Physical Therapy; Future      Discussion and Plan:    79-year-old female who presents for orthopedic evaluation after sustaining a ground level fall with a closed nondisplaced left distal radius fracture and a right wrist sprain  Treatment options were discussed with the patient which include conservative modalities in the form of immobilization, physical therapy and over-the-counter analgesics  After discussion with the patient, the decision was made to place her left upper extremity in a sugar-tong splint and provided with a removable wrist brace for her right wrist   A script for physical therapy was provided for her to start with her right wrist   With regard to her left distal radius fracture, she will follow-up in 1 week for repeat imaging  Depending upon fracture alignment on follow-up imaging will determine surgical versus continued conservative treatment  If reduction is maintained then she will be transitioned into a short-arm cast at her follow-up  Subjective:   Patient ID: Nohemi Rubin is a 60 y o  female      79-year-old right-hand-dominant female who presents for orthopedic evaluation after sustaining a left distal radius fracture and right wrist sprain on 06/02/2022  She was playing tennis when her right ankle rolled and she fell on her left upper extremity today  She presented to the emergency room and was found have a left distal radius fracture  She was placed in a volar splint and referred to orthopedics for outpatient follow-up  On presentation today she says that her left wrist pain is well controlled and her right wrist hurts more than the left  He denies numbness or tingling to left upper extremity    She is retired  She does not smoke  No diabetes  The following portions of the patient's history were reviewed and updated as appropriate: allergies, current medications, past family history, past medical history, past social history, past surgical history and problem list     Review of Systems   Constitutional: Negative for fever  HENT: Negative for congestion  Eyes: Negative for photophobia  Respiratory: Negative for shortness of breath  Cardiovascular: Negative for chest pain  Gastrointestinal: Negative for nausea and vomiting  Musculoskeletal: Positive for arthralgias  Skin: Negative for rash  Neurological: Negative for headaches  Psychiatric/Behavioral: Negative for behavioral problems  Objective:  /80   Pulse (!) 49   Ht 5' 4 75" (1 645 m)   Wt 68 9 kg (152 lb)   BMI 25 49 kg/m²       Right Hand Exam     Tenderness   The patient is experiencing tenderness in the ulnar area  Range of Motion   The patient has normal right wrist ROM  Other   Erythema: absent  Scars: absent  Sensation: normal  Pulse: present      Left Hand Exam     Tenderness   The patient is experiencing tenderness in the dorsal area and palmar area  Other   Erythema: absent  Scars: absent  Sensation: normal  Pulse: present            Physical Exam  HENT:      Head: Normocephalic  Right Ear: External ear normal       Left Ear: External ear normal       Nose: Nose normal       Mouth/Throat:      Pharynx: Oropharynx is clear  Eyes:      Pupils: Pupils are equal, round, and reactive to light  Cardiovascular:      Rate and Rhythm: Normal rate  Pulses: Normal pulses  Pulmonary:      Effort: Pulmonary effort is normal    Abdominal:      Palpations: Abdomen is soft  Musculoskeletal:      Cervical back: Normal range of motion  Skin:     General: Skin is warm  Capillary Refill: Capillary refill takes less than 2 seconds  Neurological:      Mental Status: She is alert   Mental status is at baseline  Psychiatric:         Mood and Affect: Mood normal              I have personally reviewed pertinent films in PACS and my interpretation is as follows  Orthogonal x-rays of the left wrist shows a nondisplaced distal radius fracture which is extra-articular  Orthogonal x-rays of the right wrist show no acute fracture dislocations    Splint application    Date/Time: 6/3/2022 9:34 AM  Performed by: Melynda Boas, MD  Authorized by: Melynda Boas, MD   Universal Protocol:  Consent: Verbal consent obtained    Risks and benefits: risks, benefits and alternatives were discussed  Consent given by: patient  Patient identity confirmed: verbally with patient      Pre-procedure details:     Sensation:  Normal  Procedure details:     Laterality:  Left    Location:  Wrist    Wrist:  L wrist    Strapping: no      Splint type:  Sugar tong    Supplies:  Cotton padding and Ortho-Glass

## 2022-06-04 DIAGNOSIS — S52.532A CLOSED COLLES' FRACTURE OF LEFT RADIUS, INITIAL ENCOUNTER: Primary | ICD-10-CM

## 2022-06-06 ENCOUNTER — TELEPHONE (OUTPATIENT)
Dept: OBGYN CLINIC | Facility: HOSPITAL | Age: 62
End: 2022-06-06

## 2022-06-06 NOTE — TELEPHONE ENCOUNTER
Spoke to patient  She stated she had to take her splint off to remove her contacts on Friday  Stated she tried her best to put it back on the same way it came off but she isnt sure if she did it correctly  She stated it does feel stable but she doesn't know as she is not medical  She asked if she should come in and have someone check it out  Did advise Dr Jim Leiva is in the OR and [facility] does not have any openings at this time  She reports no increase in pain and stated the splint does feel okay  Please advise

## 2022-06-06 NOTE — TELEPHONE ENCOUNTER
Dr Jose Carlos Deo    [phone]    Patients left arm was splinted on Friday  She states that she needed to remove the splint Friday evening to get her contacts out  She is unsure if she has it splinted correctly  Please advise

## 2022-06-10 ENCOUNTER — HOSPITAL ENCOUNTER (OUTPATIENT)
Dept: RADIOLOGY | Facility: HOSPITAL | Age: 62
Discharge: HOME | End: 2022-06-10
Attending: ORTHOPAEDIC SURGERY
Payer: COMMERCIAL

## 2022-06-10 ENCOUNTER — OFFICE VISIT (OUTPATIENT)
Dept: OBGYN CLINIC | Facility: HOSPITAL | Age: 62
End: 2022-06-10
Payer: COMMERCIAL

## 2022-06-10 VITALS
WEIGHT: 152 LBS | HEIGHT: 65 IN | BODY MASS INDEX: 25.33 KG/M2 | SYSTOLIC BLOOD PRESSURE: 116 MMHG | DIASTOLIC BLOOD PRESSURE: 76 MMHG | HEART RATE: 47 BPM

## 2022-06-10 DIAGNOSIS — S52.532A CLOSED COLLES' FRACTURE OF LEFT RADIUS, INITIAL ENCOUNTER: Primary | ICD-10-CM

## 2022-06-10 DIAGNOSIS — S52.532A CLOSED COLLES' FRACTURE OF LEFT RADIUS, INITIAL ENCOUNTER: ICD-10-CM

## 2022-06-10 PROCEDURE — 25600 CLTX DST RDL FX/EPHYS SEP WO: CPT | Performed by: ORTHOPAEDIC SURGERY

## 2022-06-10 PROCEDURE — 99214 OFFICE O/P EST MOD 30 MIN: CPT | Performed by: ORTHOPAEDIC SURGERY

## 2022-06-10 PROCEDURE — 73110 X-RAY EXAM OF WRIST: CPT

## 2022-06-10 NOTE — PROGRESS NOTES
Orthopaedics Office Visit - Follow up Patient Visit    ASSESSMENT/PLAN:    Assessment:   Closed nondisplaced left distal radius fracture and a right wrist sprain   DOI 6/2/22   Patient removed splint this week on left side  LEft side with maintained alignment, indicated for conservative mgmt      Plan:   - Discussed conservative treatment with patient at length  - patient placed in a short-arm cast for the left wrist   Maintain as directed  - Please keep cast dry at all times  Contact office if cast becomes wet or damaged  - continue nonweightbearing left upper extremity  - Weightbearing as tolerated right upper extremity  - Over the counter analgesics as needed / directed   - Ice / heat as directed   - Follow up 5 weeks with repeat XR       To Do Next Visit:  XR left wrist, cast off     _____________________________________________________  CHIEF COMPLAINT:  Chief Complaint   Patient presents with    Left Wrist - Follow-up         SUBJECTIVE:  Tanner Harrison is a 60 y o  female who presents   To the office for follow-up evaluation of her bilateral wrist   Patient is 8 days status post injury resulting left distal radius fracture and right wrist sprain  Patient states that   A few days ago she was attempting to adjust her contacts and states that her  molded splint was removed  But states she returns splint back to the assigned position  Patient states she has minimal pain in her left wrist currently with the splint in position  Patient states she continues to have pain on the radial aspect her right wrist becomes worse with range of motion of the wrist   Patient states she has been taking Motrin as needed for pain with mild relief of symptoms  Patient denies any numbness or tingling in her fingers  Patient offers no other complaints at this time        PAST MEDICAL HISTORY:  Past Medical History:   Diagnosis Date    Cephalgia     History of ischemic right MCA stroke     Transient neurologic deficit PAST SURGICAL HISTORY:  History reviewed  No pertinent surgical history  FAMILY HISTORY:  Family History   Problem Relation Age of Onset    Lymphoma Father        SOCIAL HISTORY:  Social History     Tobacco Use    Smoking status: Former Smoker     Types: Cigarettes     Quit date:      Years since quittin 4    Smokeless tobacco: Never Used   Vaping Use    Vaping Use: Never used   Substance Use Topics    Alcohol use: No    Drug use: No       MEDICATIONS:    Current Outpatient Medications:     apixaban (ELIQUIS) 5 mg, Take 1 tablet (5 mg total) by mouth 2 (two) times a day, Disp: 180 tablet, Rfl: 3    Cod Liver Oil 1250-130 units CAPS, Take by mouth, Disp: , Rfl:     Elderberry 575 MG/5ML SYRP, Take by mouth, Disp: , Rfl:     Zinc 25 MG TABS, Take by mouth, Disp: , Rfl:     ALLERGIES:  No Known Allergies    REVIEW OF SYSTEMS:  MSK: bilateral wrist pain   Neuro: WNL   Pertinent items are otherwise noted in HPI  A comprehensive review of systems was otherwise negative  LABS:  HgA1c:   Lab Results   Component Value Date    HGBA1C 5 8 10/08/2017     BMP:   Lab Results   Component Value Date    CALCIUM 9 0 10/07/2017    K 3 7 10/07/2017    CO2 28 10/07/2017     10/07/2017    BUN 14 10/07/2017    CREATININE 0 78 10/07/2017     CBC: No components found for: CBC    _____________________________________________________  PHYSICAL EXAMINATION:  Vital signs: /76   Pulse (!) 47   Ht 5' 4 75" (1 645 m)   Wt 68 9 kg (152 lb)   BMI 25 49 kg/m²   General: No acute distress, awake and alert  Psychiatric: Mood and affect appear appropriate  HEENT: Trachea Midline, No torticollis, no apparent facial trauma  Cardiovascular: No audible murmurs;  Extremities appear perfused  Pulmonary: No audible wheezing or stridor  Skin: No open lesions; see further details (if any) below      MUSCULOSKELETAL EXAMINATION:  Right Hand Exam      Tenderness   The patient is experiencing tenderness in the  Distal radial region     Range of Motion   The patient has normal right wrist ROM  Other   Erythema: absent  Scars: absent  Sensation: normal  Pulse: present          Left Hand Exam      Tenderness   The patient is experiencing tenderness in the dorsal area and palmar area       Other   Erythema: absent  Scars: absent  Sensation: normal  Pulse: present      _____________________________________________________  STUDIES REVIEWED:  I personally reviewed the images and interpretation is as follows:  Left wrist XR 3 views:   healing distal radius fracture  With slight loss of volar tilt in comparison to previous radiographs but at neutral alignment      PROCEDURES PERFORMED:  Fracture / Dislocation Treatment    Date/Time: 6/10/2022 7:50 AM  Performed by: Janeth Gaines MD  Authorized by: Janeth Gaines MD     Patient Location:  [facility] Protocol:  Consent: Verbal consent obtained  Risks and benefits: risks, benefits and alternatives were discussed  Consent given by: patient  Patient understanding: patient states understanding of the procedure being performed  Site marked: the operative site was marked  Patient identity confirmed: verbally with patient      Injury location:  Wrist  Location details:  Left wrist  Injury type:  Fracture  Fracture type: distal radius    Fracture type: distal radius    Distal perfusion: normal    Neurological function: normal    Range of motion: normal    Local anesthesia used?: No    Manipulation performed?: No    Immobilization:  Cast  Cast type:  Short arm  Neurovascular status: Neurovascularly intact    Distal perfusion: normal    Neurological function: normal    Range of motion: normal    Patient tolerance:  Patient tolerated the procedure well with no immediate complications          Kieran Jaeger PA-C -assisting    Janeth Gaines MD              Portions of the record may have been created with voice recognition software    Occasional wrong word or "sound a like" substitutions may have occurred due to the inherent limitations of voice recognition software  Read the chart carefully and recognize, using context, where substitutions have occurred

## 2022-07-08 DIAGNOSIS — S52.532A CLOSED COLLES' FRACTURE OF LEFT RADIUS, INITIAL ENCOUNTER: Primary | ICD-10-CM

## 2022-07-13 ENCOUNTER — OFFICE VISIT (OUTPATIENT)
Dept: OBGYN CLINIC | Facility: HOSPITAL | Age: 62
End: 2022-07-13

## 2022-07-13 ENCOUNTER — HOSPITAL ENCOUNTER (OUTPATIENT)
Dept: RADIOLOGY | Facility: HOSPITAL | Age: 62
Discharge: HOME | End: 2022-07-13
Attending: ORTHOPAEDIC SURGERY
Payer: COMMERCIAL

## 2022-07-13 VITALS
BODY MASS INDEX: 25.33 KG/M2 | DIASTOLIC BLOOD PRESSURE: 69 MMHG | HEIGHT: 65 IN | SYSTOLIC BLOOD PRESSURE: 111 MMHG | HEART RATE: 49 BPM | WEIGHT: 152 LBS

## 2022-07-13 DIAGNOSIS — S52.532A CLOSED COLLES' FRACTURE OF LEFT RADIUS, INITIAL ENCOUNTER: Primary | ICD-10-CM

## 2022-07-13 DIAGNOSIS — S52.532A CLOSED COLLES' FRACTURE OF LEFT RADIUS, INITIAL ENCOUNTER: ICD-10-CM

## 2022-07-13 DIAGNOSIS — S63.501A SPRAIN OF RIGHT WRIST, INITIAL ENCOUNTER: ICD-10-CM

## 2022-07-13 PROCEDURE — 73110 X-RAY EXAM OF WRIST: CPT

## 2022-07-13 PROCEDURE — 99024 POSTOP FOLLOW-UP VISIT: CPT | Performed by: ORTHOPAEDIC SURGERY

## 2022-07-13 NOTE — PROGRESS NOTES
Assessment:  1  Closed Colles' fracture of left radius, initial encounter  Brace    Ambulatory referral to PT/OT hand therapy   2  Sprain of right wrist, initial encounter  Ambulatory referral to PT/OT hand therapy       Plan:  6 weeks s/p left distal radius fracture and right wrist sprain, 22  · Radiograph displays healed fracture of left distal radius fracture  · Start PT-OT for bilateral wrist  ·  Focus on left wrist  · Left wrist brace provided  · Patient to wean out as needed  · Weight bear as tolerated  · Follow up in 6 weeks    To do next visit:  Return in about 6 weeks (around 2022)  The above stated was discussed in layman's terms and the patient expressed understanding  All questions were answered to the patient's satisfaction  Scribe Attestation    I,:  Yenifer Byers am acting as a scribe while in the presence of the attending physician :       I,:  Alberto Good MD personally performed the services described in this documentation    as scribed in my presence :             Subjective:   Yeimi Chen is a 60 y o  female who presents nearly 6 weeks s/p left distal radius fracture and right wrist sprain, 22  She is doing well  Today she complains of left wrist stiffness and improving right radial wrist occasional pain  She has been compliant with cast   She had used IBU several weeks ago yet has stopped recently  Review of systems negative unless otherwise specified in HPI    Past Medical History:   Diagnosis Date    Cephalgia     History of ischemic right MCA stroke     Transient neurologic deficit        History reviewed  No pertinent surgical history      Family History   Problem Relation Age of Onset    Lymphoma Father        Social History     Occupational History    Not on file   Tobacco Use    Smoking status: Former Smoker     Types: Cigarettes     Quit date:      Years since quittin 5    Smokeless tobacco: Never Used   Vaping Use    Vaping Use: Never used   Substance and Sexual Activity    Alcohol use: No    Drug use: No    Sexual activity: Not on file         Current Outpatient Medications:     apixaban (ELIQUIS) 5 mg, Take 1 tablet (5 mg total) by mouth 2 (two) times a day, Disp: 180 tablet, Rfl: 3    Cod Liver Oil 1250-130 units CAPS, Take by mouth, Disp: , Rfl:     Elderberry 575 MG/5ML SYRP, Take by mouth, Disp: , Rfl:     Zinc 25 MG TABS, Take by mouth, Disp: , Rfl:     No Known Allergies         Vitals:    07/13/22 0923   BP: 111/69   Pulse: (!) 49       Objective:  Physical exam  · General: Awake, Alert, Oriented  · Eyes: Pupils equal, round and reactive to light  · Heart: regular rate and rhythm  · Lungs: No audible wheezing  · Abdomen: soft                    Ortho Exam  Left wrist:  Non-tender over fracture site  Mild restriction with ROM  Patient can move all fingers  Sensation intact    Right wrist:  Full ROM  Sensation intact     Diagnostics, reviewed and taken today if performed as documented: The attending physician has personally reviewed the pertinent films in PACS and interpretation is as follows:  Left wrist x-ray:  Healed distal radius fracture  Procedures, if performed today:    Procedures    None performed      Portions of the record may have been created with voice recognition software  Occasional wrong word or "sound a like" substitutions may have occurred due to the inherent limitations of voice recognition software  Read the chart carefully and recognize, using context, where substitutions have occurred

## 2022-07-18 ENCOUNTER — EVALUATION (OUTPATIENT)
Dept: OCCUPATIONAL THERAPY | Age: 62
End: 2022-07-18
Payer: COMMERCIAL

## 2022-07-18 DIAGNOSIS — S63.501A SPRAIN OF RIGHT WRIST, INITIAL ENCOUNTER: ICD-10-CM

## 2022-07-18 DIAGNOSIS — S52.532A CLOSED COLLES' FRACTURE OF LEFT RADIUS, INITIAL ENCOUNTER: ICD-10-CM

## 2022-07-18 PROCEDURE — 97110 THERAPEUTIC EXERCISES: CPT | Performed by: OCCUPATIONAL THERAPIST

## 2022-07-18 PROCEDURE — 97165 OT EVAL LOW COMPLEX 30 MIN: CPT | Performed by: OCCUPATIONAL THERAPIST

## 2022-07-18 NOTE — PROGRESS NOTES
OT Evaluation     Today's date: 2022  Patient name: Stephan Tee  : 1960  MRN: [id number]  Referring provider: Rosalina Hartley MD  Dx:   Encounter Diagnosis     ICD-10-CM    1  Closed Colles' fracture of left radius, initial encounter  S52 532A Ambulatory referral to PT/OT hand therapy   2  Sprain of right wrist, initial encounter  S63 [id number] Ambulatory referral to PT/OT hand therapy                  Assessment  Assessment details: Parul Hutchinson is 1 week from her L cast removal for a closed DRF; she has swelling and stiffness/decreased AROM with some pain, affecting her ADLs, IADLs  Recommend continued tx  Goals  STG) Motion increased by 25% in 4-6 weeks  STG) Strength/Motor skills improved by 25% in 4-6 weeks  STG) SwellingEdema improved by 25% in 4-6 weeks  STG) Pain decreased by 25% in 4-6 weeks    LTG) ADL and IADL skills improved   LTG) Leisure skills improved    Patient Goals: To joanne back to tennis      Goals, plan of care and treatment condition discussed with patient  Patient expresses their understanding and questions regarding these issues were addressed  Plan  Plan details: Pt has a significant deductible, wishes to do HEP with checks PRN    Patient would benefit from: OT eval and custom splinting  Planned modality interventions: TENS, thermotherapy: hydrocollator packs, thermotherapy: paraffin bath and ultrasound  Planned therapy interventions: neuromuscular re-education, motor coordination training, manual therapy, joint mobilization, Almanzar taping, strengthening, stretching, therapeutic activities, therapeutic exercise, functional ROM exercises, fine motor coordination training and orthotic fitting/training  Frequency: 2x month  Duration in visits: 2  Treatment plan discussed with: patient        Subjective Evaluation    History of Present Illness  Mechanism of injury: 22 L distal radius fx (casted) and R wrist sprain -closed reduction w/ cast  She has no acute complaints with her R   Pain  Current pain ratin  At worst pain ratin    Hand dominance: right      Diagnostic Tests  X-ray: abnormal        Objective     Active Range of Motion     Left Elbow   Forearm supination: 50 degrees   Forearm pronation: 60 degrees     Left Wrist   Wrist flexion: 40 degrees   Wrist extension: 60 degrees   Radial deviation: 5 degrees   Ulnar deviation: 30 degrees     Additional Active Range of Motion Details  95% composite fist, hook    Strength/Myotome Testing     Left Wrist/Hand   Wrist extension: 4+  Wrist flexion: 4+  Radial deviation: 4+  Ulnar deviation: 4+     (2nd hand position)     Trial 1: 18 6    Right Wrist/Hand      (2nd hand position)     Trial 1: 49 5    Tests     Additional Tests Details  Reports minimal N/T    Swelling     Left Wrist/Hand   Circumference wrist: 16 9 cm    Right Wrist/Hand   Circumference wrist: 16 cm             Precautions: Fx  HEP: Wrist/FA PROM, pinch/grasp RTP        Manuals                                                                 Neuro Re-Ed                                                                                                        Ther Ex                                                                                                                     Ther Activity                                       Gait Training                                       Modalities

## 2022-08-21 DIAGNOSIS — S52.532A CLOSED COLLES' FRACTURE OF LEFT RADIUS, INITIAL ENCOUNTER: Primary | ICD-10-CM

## 2024-02-28 NOTE — SPEECH THERAPY NOTE
Speech Language/Pathology  Speech/Language Pathology  Assessment    Patient Name: Jesse Toth  ZMXUB'F Date: 10/8/2017     Problem List  Patient Active Problem List   Diagnosis    Symptoms of cerebrovascular accident (CVA)     Past Medical History  History reviewed  No pertinent past medical history  Past Surgical History  History reviewed  No pertinent surgical history  Consult received for speech/swallow eval on stroke pathway  61 y o  female who presents with left upper extremity distal weakness and left facial numbness noted yesterday  She also reports some numbness and tingling on her forearm area  At the same time she also noticed left facial droop /numbness  The symptoms resolved within a couple of minutes and have not recurred    Pt passed nsg swallow screen; tolerating regular diet w/o s/s dysphagia or aspiration  No speech/language deficits reported  NIH score is 0  No need for formal speech/swallow eval at this time  Reconsult if needed    Lakia Stanton, SLP Test negative, no change in treatment.

## 2024-09-18 ENCOUNTER — HOSPITAL ENCOUNTER (OUTPATIENT)
Dept: RADIOLOGY | Age: 64
Discharge: HOME | End: 2024-09-18
Payer: COMMERCIAL

## 2024-09-18 DIAGNOSIS — R10.2 PELVIC PAIN SYNDROME: ICD-10-CM

## 2024-09-18 DIAGNOSIS — R10.10 UPPER ABDOMINAL PAIN: ICD-10-CM

## 2024-09-18 PROCEDURE — 76856 US EXAM PELVIC COMPLETE: CPT

## 2024-09-18 PROCEDURE — 76700 US EXAM ABDOM COMPLETE: CPT
